# Patient Record
Sex: FEMALE | Race: WHITE | NOT HISPANIC OR LATINO | Employment: OTHER | URBAN - METROPOLITAN AREA
[De-identification: names, ages, dates, MRNs, and addresses within clinical notes are randomized per-mention and may not be internally consistent; named-entity substitution may affect disease eponyms.]

---

## 2023-06-30 DIAGNOSIS — E03.8 OTHER SPECIFIED HYPOTHYROIDISM: ICD-10-CM

## 2023-06-30 DIAGNOSIS — K21.00 GASTROESOPHAGEAL REFLUX DISEASE WITH ESOPHAGITIS WITHOUT HEMORRHAGE: ICD-10-CM

## 2023-07-01 RX ORDER — LEVOTHYROXINE SODIUM 50 UG/1
TABLET ORAL
Qty: 90 TABLET | Refills: 3 | Status: SHIPPED | OUTPATIENT
Start: 2023-07-01 | End: 2024-01-10 | Stop reason: SDUPTHER

## 2023-07-01 RX ORDER — OMEPRAZOLE 40 MG/1
CAPSULE, DELAYED RELEASE ORAL
Qty: 90 CAPSULE | Refills: 3 | Status: SHIPPED | OUTPATIENT
Start: 2023-07-01 | End: 2024-04-01

## 2023-09-12 PROBLEM — I10 HIGH BLOOD PRESSURE: Status: ACTIVE | Noted: 2023-09-12

## 2023-09-12 PROBLEM — E03.9 HYPOTHYROIDISM: Status: ACTIVE | Noted: 2023-09-12

## 2023-09-12 PROBLEM — K21.9 GASTROESOPHAGEAL REFLUX DISEASE: Status: ACTIVE | Noted: 2023-09-12

## 2023-09-12 PROBLEM — I49.3 VENTRICULAR PREMATURE COMPLEX: Status: ACTIVE | Noted: 2023-09-12

## 2023-09-12 PROBLEM — R92.8 ABNORMAL FINDING ON BREAST IMAGING: Status: ACTIVE | Noted: 2023-09-12

## 2023-09-12 PROBLEM — R00.2 HEART PALPITATIONS: Status: ACTIVE | Noted: 2023-09-12

## 2023-09-12 PROBLEM — M81.0 OSTEOPOROSIS: Status: ACTIVE | Noted: 2023-09-12

## 2023-09-12 PROBLEM — R09.89 PULSE IRREGULARITY: Status: ACTIVE | Noted: 2023-09-12

## 2023-09-12 PROBLEM — Q39.8 SHORT ESOPHAGUS: Status: ACTIVE | Noted: 2023-09-12

## 2023-09-12 PROBLEM — D64.9 ANEMIA: Status: ACTIVE | Noted: 2023-09-12

## 2023-09-12 PROBLEM — K44.9 HIATAL HERNIA: Status: ACTIVE | Noted: 2023-09-12

## 2023-09-12 PROBLEM — I51.7 CARDIOMEGALY: Status: ACTIVE | Noted: 2023-09-12

## 2023-09-12 PROBLEM — I82.90 VENOUS THROMBOSIS: Status: ACTIVE | Noted: 2023-09-12

## 2023-09-12 PROBLEM — M19.90 ARTHRITIS: Status: ACTIVE | Noted: 2023-09-12

## 2023-09-12 PROBLEM — E78.00 HYPERCHOLESTEROLEMIA: Status: ACTIVE | Noted: 2023-09-12

## 2023-09-12 PROBLEM — E56.9 VITAMIN DEFICIENCY: Status: ACTIVE | Noted: 2023-09-12

## 2023-09-12 RX ORDER — CALCIUM CARBONATE 600 MG
1 TABLET ORAL DAILY
COMMUNITY
Start: 2021-08-16

## 2023-09-12 RX ORDER — ACETAMINOPHEN 500 MG
1 TABLET ORAL DAILY
COMMUNITY
Start: 2021-08-16

## 2023-09-12 RX ORDER — CALCIUM CARBONATE/MULTIVITAMIN
1 TABLET,CHEWABLE ORAL DAILY
COMMUNITY
Start: 2019-11-08

## 2023-09-12 RX ORDER — METOPROLOL TARTRATE 100 MG/1
1 TABLET ORAL 2 TIMES DAILY
COMMUNITY

## 2023-09-12 RX ORDER — ROSUVASTATIN CALCIUM 10 MG/1
1 TABLET, COATED ORAL DAILY
COMMUNITY
Start: 2019-10-23 | End: 2024-04-12 | Stop reason: ALTCHOICE

## 2023-09-12 RX ORDER — AMLODIPINE BESYLATE 10 MG/1
1 TABLET ORAL DAILY
COMMUNITY
Start: 2019-10-23 | End: 2023-12-26

## 2023-09-12 RX ORDER — ENOXAPARIN SODIUM 100 MG/ML
40 INJECTION SUBCUTANEOUS DAILY
COMMUNITY
End: 2024-01-10 | Stop reason: WASHOUT

## 2023-09-12 RX ORDER — LOSARTAN POTASSIUM 50 MG/1
1 TABLET ORAL DAILY
COMMUNITY
End: 2024-04-12 | Stop reason: ALTCHOICE

## 2023-09-12 RX ORDER — ROSUVASTATIN CALCIUM 20 MG/1
1 TABLET, COATED ORAL DAILY
COMMUNITY
Start: 2019-10-23 | End: 2023-11-09

## 2023-09-12 RX ORDER — METOPROLOL SUCCINATE 100 MG/1
1 TABLET, EXTENDED RELEASE ORAL DAILY
COMMUNITY
End: 2024-05-07

## 2023-09-12 RX ORDER — OLMESARTAN MEDOXOMIL AND HYDROCHLOROTHIAZIDE 40/25 40; 25 MG/1; MG/1
1 TABLET ORAL DAILY
COMMUNITY
Start: 2021-12-10 | End: 2023-12-26

## 2023-09-12 RX ORDER — OLMESARTAN MEDOXOMIL 40 MG/1
1 TABLET ORAL DAILY
COMMUNITY
End: 2024-04-12 | Stop reason: ALTCHOICE

## 2023-09-12 RX ORDER — OXYCODONE HCL 5 MG/5 ML
5 SOLUTION, ORAL ORAL EVERY 4 HOURS PRN
COMMUNITY
End: 2024-01-10 | Stop reason: WASHOUT

## 2023-09-12 RX ORDER — LORATADINE 10 MG/1
1 TABLET ORAL DAILY
COMMUNITY

## 2023-12-24 DIAGNOSIS — I10 HYPERTENSION, UNSPECIFIED TYPE: Primary | ICD-10-CM

## 2023-12-26 RX ORDER — OLMESARTAN MEDOXOMIL AND HYDROCHLOROTHIAZIDE 40/25 40; 25 MG/1; MG/1
1 TABLET ORAL DAILY
Qty: 90 TABLET | Refills: 3 | Status: SHIPPED | OUTPATIENT
Start: 2023-12-26

## 2023-12-26 RX ORDER — AMLODIPINE BESYLATE 10 MG/1
10 TABLET ORAL DAILY
Qty: 90 TABLET | Refills: 3 | Status: SHIPPED | OUTPATIENT
Start: 2023-12-26

## 2024-01-08 DIAGNOSIS — E78.00 HYPERCHOLESTEROLEMIA: ICD-10-CM

## 2024-01-08 DIAGNOSIS — I10 PRIMARY HYPERTENSION: ICD-10-CM

## 2024-01-08 DIAGNOSIS — E03.9 HYPOTHYROIDISM, UNSPECIFIED TYPE: ICD-10-CM

## 2024-01-09 ENCOUNTER — LAB (OUTPATIENT)
Dept: LAB | Facility: LAB | Age: 79
End: 2024-01-09
Payer: COMMERCIAL

## 2024-01-09 DIAGNOSIS — E78.00 HYPERCHOLESTEROLEMIA: ICD-10-CM

## 2024-01-09 DIAGNOSIS — E03.9 HYPOTHYROIDISM, UNSPECIFIED TYPE: ICD-10-CM

## 2024-01-09 DIAGNOSIS — I10 PRIMARY HYPERTENSION: ICD-10-CM

## 2024-01-09 LAB
ALBUMIN SERPL BCP-MCNC: 4 G/DL (ref 3.4–5)
ALP SERPL-CCNC: 48 U/L (ref 33–136)
ALT SERPL W P-5'-P-CCNC: 14 U/L (ref 7–45)
ANION GAP SERPL CALC-SCNC: 14 MMOL/L (ref 10–20)
APPEARANCE UR: ABNORMAL
AST SERPL W P-5'-P-CCNC: 18 U/L (ref 9–39)
BACTERIA #/AREA URNS AUTO: ABNORMAL /HPF
BILIRUB SERPL-MCNC: 0.7 MG/DL (ref 0–1.2)
BILIRUB UR STRIP.AUTO-MCNC: NEGATIVE MG/DL
BUN SERPL-MCNC: 24 MG/DL (ref 6–23)
CALCIUM SERPL-MCNC: 9.4 MG/DL (ref 8.6–10.6)
CHLORIDE SERPL-SCNC: 103 MMOL/L (ref 98–107)
CHOLEST SERPL-MCNC: 154 MG/DL (ref 0–199)
CHOLESTEROL/HDL RATIO: 2.6
CO2 SERPL-SCNC: 28 MMOL/L (ref 21–32)
COLOR UR: YELLOW
CREAT SERPL-MCNC: 1.14 MG/DL (ref 0.5–1.05)
EGFRCR SERPLBLD CKD-EPI 2021: 49 ML/MIN/1.73M*2
ERYTHROCYTE [DISTWIDTH] IN BLOOD BY AUTOMATED COUNT: 12.1 % (ref 11.5–14.5)
GLUCOSE SERPL-MCNC: 103 MG/DL (ref 74–99)
GLUCOSE UR STRIP.AUTO-MCNC: NEGATIVE MG/DL
HCT VFR BLD AUTO: 35.3 % (ref 36–46)
HDLC SERPL-MCNC: 59 MG/DL
HGB BLD-MCNC: 11.5 G/DL (ref 12–16)
KETONES UR STRIP.AUTO-MCNC: NEGATIVE MG/DL
LDLC SERPL CALC-MCNC: 75 MG/DL
LEUKOCYTE ESTERASE UR QL STRIP.AUTO: ABNORMAL
MCH RBC QN AUTO: 33 PG (ref 26–34)
MCHC RBC AUTO-ENTMCNC: 32.6 G/DL (ref 32–36)
MCV RBC AUTO: 101 FL (ref 80–100)
NITRITE UR QL STRIP.AUTO: NEGATIVE
NON HDL CHOLESTEROL: 95 MG/DL (ref 0–149)
NRBC BLD-RTO: 0 /100 WBCS (ref 0–0)
PH UR STRIP.AUTO: 6 [PH]
PLATELET # BLD AUTO: 246 X10*3/UL (ref 150–450)
POTASSIUM SERPL-SCNC: 4 MMOL/L (ref 3.5–5.3)
PROT SERPL-MCNC: 7.4 G/DL (ref 6.4–8.2)
PROT UR STRIP.AUTO-MCNC: NEGATIVE MG/DL
RBC # BLD AUTO: 3.49 X10*6/UL (ref 4–5.2)
RBC # UR STRIP.AUTO: NEGATIVE /UL
RBC #/AREA URNS AUTO: ABNORMAL /HPF
SODIUM SERPL-SCNC: 141 MMOL/L (ref 136–145)
SP GR UR STRIP.AUTO: 1.01
TRIGL SERPL-MCNC: 102 MG/DL (ref 0–149)
TSH SERPL-ACNC: 2.22 MIU/L (ref 0.44–3.98)
UROBILINOGEN UR STRIP.AUTO-MCNC: <2 MG/DL
VLDL: 20 MG/DL (ref 0–40)
WBC # BLD AUTO: 6.6 X10*3/UL (ref 4.4–11.3)
WBC #/AREA URNS AUTO: >50 /HPF

## 2024-01-09 PROCEDURE — 85027 COMPLETE CBC AUTOMATED: CPT

## 2024-01-09 PROCEDURE — 36415 COLL VENOUS BLD VENIPUNCTURE: CPT

## 2024-01-09 PROCEDURE — 80061 LIPID PANEL: CPT

## 2024-01-09 PROCEDURE — 80053 COMPREHEN METABOLIC PANEL: CPT

## 2024-01-09 PROCEDURE — 84443 ASSAY THYROID STIM HORMONE: CPT

## 2024-01-09 PROCEDURE — 81001 URINALYSIS AUTO W/SCOPE: CPT

## 2024-01-10 ENCOUNTER — OFFICE VISIT (OUTPATIENT)
Dept: PRIMARY CARE | Facility: CLINIC | Age: 79
End: 2024-01-10
Payer: COMMERCIAL

## 2024-01-10 VITALS
HEIGHT: 60 IN | WEIGHT: 193 LBS | BODY MASS INDEX: 37.89 KG/M2 | SYSTOLIC BLOOD PRESSURE: 128 MMHG | DIASTOLIC BLOOD PRESSURE: 76 MMHG

## 2024-01-10 DIAGNOSIS — N28.9 RENAL INSUFFICIENCY: Primary | ICD-10-CM

## 2024-01-10 DIAGNOSIS — R53.83 OTHER FATIGUE: ICD-10-CM

## 2024-01-10 DIAGNOSIS — E78.00 HYPERCHOLESTEROLEMIA: ICD-10-CM

## 2024-01-10 DIAGNOSIS — R73.03 PRE-DIABETES: ICD-10-CM

## 2024-01-10 DIAGNOSIS — E03.8 OTHER SPECIFIED HYPOTHYROIDISM: ICD-10-CM

## 2024-01-10 DIAGNOSIS — I10 HYPERTENSION, UNSPECIFIED TYPE: ICD-10-CM

## 2024-01-10 PROCEDURE — 3074F SYST BP LT 130 MM HG: CPT | Performed by: INTERNAL MEDICINE

## 2024-01-10 PROCEDURE — 3078F DIAST BP <80 MM HG: CPT | Performed by: INTERNAL MEDICINE

## 2024-01-10 PROCEDURE — 1126F AMNT PAIN NOTED NONE PRSNT: CPT | Performed by: INTERNAL MEDICINE

## 2024-01-10 PROCEDURE — 1159F MED LIST DOCD IN RCRD: CPT | Performed by: INTERNAL MEDICINE

## 2024-01-10 PROCEDURE — 99214 OFFICE O/P EST MOD 30 MIN: CPT | Performed by: INTERNAL MEDICINE

## 2024-01-10 RX ORDER — LEVOTHYROXINE SODIUM 50 UG/1
50 TABLET ORAL DAILY
Qty: 90 TABLET | Refills: 1 | Status: SHIPPED | OUTPATIENT
Start: 2024-01-10 | End: 2024-03-16

## 2024-01-10 ASSESSMENT — ENCOUNTER SYMPTOMS
DEPRESSION: 0
OCCASIONAL FEELINGS OF UNSTEADINESS: 0
LOSS OF SENSATION IN FEET: 0

## 2024-01-11 DIAGNOSIS — E78.2 MIXED HYPERLIPIDEMIA: ICD-10-CM

## 2024-01-11 RX ORDER — ROSUVASTATIN CALCIUM 20 MG/1
20 TABLET, COATED ORAL DAILY
Qty: 90 TABLET | Refills: 3 | OUTPATIENT
Start: 2024-01-11

## 2024-01-11 NOTE — PROGRESS NOTES
"Subjective   Patient ID: Viji Caballero is a 78 y.o. female who presents for Follow-up (Various conditions) and Multiple medical issues.    Viji Caballero today came here for multiple medical issues.  Overall, she is a happy person.  Appetite and weight are okay.  No chest pain or shortness of breath.  Taking medications regularly.  No side effects.  She came for follow-up on various conditions.  She tells me she drinks adequate amount of water.  She is taking medications regularly.  No side effects.    I have personally reviewed the patient's Past Medical History, Medications, Allergies, Social History, and Family History in the EMR.    Review of Systems   All other systems reviewed and are negative.    Objective   /76   Ht 1.511 m (4' 11.5\")   Wt 87.5 kg (193 lb)   BMI 38.33 kg/m²     Physical Exam  Vitals reviewed.   Cardiovascular:      Heart sounds: Normal heart sounds, S1 normal and S2 normal. No murmur heard.     No friction rub.   Pulmonary:      Effort: Pulmonary effort is normal.      Breath sounds: Normal breath sounds and air entry.   Abdominal:      Palpations: There is no hepatomegaly, splenomegaly or mass.   Musculoskeletal:      Right lower leg: No edema.      Left lower leg: No edema.   Lymphadenopathy:      Lower Body: No right inguinal adenopathy. No left inguinal adenopathy.   Neurological:      Cranial Nerves: Cranial nerves 2-12 are intact.      Sensory: No sensory deficit.      Motor: Motor function is intact.      Deep Tendon Reflexes: Reflexes are normal and symmetric.     LAB WORK: Laboratory testing discussed.    Assessment/Plan   Problem List Items Addressed This Visit             ICD-10-CM       Cardiac and Vasculature    High blood pressure I10    Hypercholesterolemia E78.00       Endocrine/Metabolic    Hypothyroidism E03.9    Relevant Medications    levothyroxine (Synthroid, Levoxyl) 50 mcg tablet    Other Relevant Orders    Comprehensive Metabolic Panel    Thyroid " Stimulating Hormone    Lipid Panel     Other Visit Diagnoses         Codes    Renal insufficiency    -  Primary N28.9    Other fatigue     R53.83    Relevant Orders    Hemoglobin A1C    Pre-diabetes     R73.03        1. Renal insufficiency.  First time BUN and creatinine up.  Drink more water, cut down the salt.  She is only on ARB, I made a note of it.  She is going to bring the medication for review and drink enough water.  I am going to review, ______ in six to eight week’s time.  2. Hypertension, good.  3. Cholesterol, excellent.  She is seeing cardiologist.  4. Hypothyroid.  Same dose of Synthroid is okay.  5. Shots, okay.  6. Pre-diabetic.  I am going to check hemoglobin A1c.  7. Follow-up in March.  I urged her that appointment we will do with Medicare Wellness Visit.  She is willing to oblige that.    Scribe Attestation  By signing my name below, I, Lina Vital, Scribe attest that this documentation has been prepared under the direction and in the presence of Sean Bunch MD.

## 2024-01-12 ENCOUNTER — OFFICE VISIT (OUTPATIENT)
Dept: CARDIOLOGY | Facility: CLINIC | Age: 79
End: 2024-01-12
Payer: COMMERCIAL

## 2024-01-12 VITALS
HEART RATE: 78 BPM | BODY MASS INDEX: 35.37 KG/M2 | OXYGEN SATURATION: 95 % | SYSTOLIC BLOOD PRESSURE: 106 MMHG | WEIGHT: 192.2 LBS | DIASTOLIC BLOOD PRESSURE: 67 MMHG | HEIGHT: 62 IN

## 2024-01-12 DIAGNOSIS — I10 PRIMARY HYPERTENSION: ICD-10-CM

## 2024-01-12 DIAGNOSIS — R00.2 HEART PALPITATIONS: ICD-10-CM

## 2024-01-12 DIAGNOSIS — E78.00 HYPERCHOLESTEROLEMIA: ICD-10-CM

## 2024-01-12 DIAGNOSIS — I51.7 CARDIOMEGALY: ICD-10-CM

## 2024-01-12 PROCEDURE — 3078F DIAST BP <80 MM HG: CPT | Performed by: INTERNAL MEDICINE

## 2024-01-12 PROCEDURE — 99214 OFFICE O/P EST MOD 30 MIN: CPT | Performed by: INTERNAL MEDICINE

## 2024-01-12 PROCEDURE — 1126F AMNT PAIN NOTED NONE PRSNT: CPT | Performed by: INTERNAL MEDICINE

## 2024-01-12 PROCEDURE — 1159F MED LIST DOCD IN RCRD: CPT | Performed by: INTERNAL MEDICINE

## 2024-01-12 PROCEDURE — 1036F TOBACCO NON-USER: CPT | Performed by: INTERNAL MEDICINE

## 2024-01-12 PROCEDURE — 3074F SYST BP LT 130 MM HG: CPT | Performed by: INTERNAL MEDICINE

## 2024-01-12 ASSESSMENT — COLUMBIA-SUICIDE SEVERITY RATING SCALE - C-SSRS
1. IN THE PAST MONTH, HAVE YOU WISHED YOU WERE DEAD OR WISHED YOU COULD GO TO SLEEP AND NOT WAKE UP?: NO
6. HAVE YOU EVER DONE ANYTHING, STARTED TO DO ANYTHING, OR PREPARED TO DO ANYTHING TO END YOUR LIFE?: NO
2. HAVE YOU ACTUALLY HAD ANY THOUGHTS OF KILLING YOURSELF?: NO

## 2024-01-12 ASSESSMENT — PATIENT HEALTH QUESTIONNAIRE - PHQ9
2. FEELING DOWN, DEPRESSED OR HOPELESS: NOT AT ALL
SUM OF ALL RESPONSES TO PHQ9 QUESTIONS 1 AND 2: 0
1. LITTLE INTEREST OR PLEASURE IN DOING THINGS: NOT AT ALL

## 2024-01-12 ASSESSMENT — PAIN SCALES - GENERAL: PAINLEVEL: 0-NO PAIN

## 2024-01-12 ASSESSMENT — LIFESTYLE VARIABLES: HOW OFTEN DO YOU HAVE A DRINK CONTAINING ALCOHOL: 2-3 TIMES A WEEK

## 2024-02-11 NOTE — PROGRESS NOTES
Subjective   Viji Caballero is a 78 y.o. female.    Chief Complaint:  Follow-up, Hyperlipidemia, and Hypertension    HPI    ROS    Objective   Physical Exam    Lab Review:   Lab on 01/09/2024   Component Date Value    Cholesterol 01/09/2024 154     HDL-Cholesterol 01/09/2024 59.0     Cholesterol/HDL Ratio 01/09/2024 2.6     LDL Calculated 01/09/2024 75     VLDL 01/09/2024 20     Triglycerides 01/09/2024 102     Non HDL Cholesterol 01/09/2024 95     Glucose 01/09/2024 103 (H)     Sodium 01/09/2024 141     Potassium 01/09/2024 4.0     Chloride 01/09/2024 103     Bicarbonate 01/09/2024 28     Anion Gap 01/09/2024 14     Urea Nitrogen 01/09/2024 24 (H)     Creatinine 01/09/2024 1.14 (H)     eGFR 01/09/2024 49 (L)     Calcium 01/09/2024 9.4     Albumin 01/09/2024 4.0     Alkaline Phosphatase 01/09/2024 48     Total Protein 01/09/2024 7.4     AST 01/09/2024 18     Bilirubin, Total 01/09/2024 0.7     ALT 01/09/2024 14     WBC 01/09/2024 6.6     nRBC 01/09/2024 0.0     RBC 01/09/2024 3.49 (L)     Hemoglobin 01/09/2024 11.5 (L)     Hematocrit 01/09/2024 35.3 (L)     MCV 01/09/2024 101 (H)     MCH 01/09/2024 33.0     MCHC 01/09/2024 32.6     RDW 01/09/2024 12.1     Platelets 01/09/2024 246     Thyroid Stimulating Horm* 01/09/2024 2.22     Color, Urine 01/09/2024 Yellow     Appearance, Urine 01/09/2024 Hazy (N)     Specific Gravity, Urine 01/09/2024 1.015     pH, Urine 01/09/2024 6.0     Protein, Urine 01/09/2024 NEGATIVE     Glucose, Urine 01/09/2024 NEGATIVE     Blood, Urine 01/09/2024 NEGATIVE     Ketones, Urine 01/09/2024 NEGATIVE     Bilirubin, Urine 01/09/2024 NEGATIVE     Urobilinogen, Urine 01/09/2024 <2.0     Nitrite, Urine 01/09/2024 NEGATIVE     Leukocyte Esterase, Urine 01/09/2024 MODERATE (2+) (A)     WBC, Urine 01/09/2024 >50 (A)     RBC, Urine 01/09/2024 1-2     Bacteria, Urine 01/09/2024 3+ (A)        Assessment/Plan   Diagnoses of Cardiomegaly, Heart palpitations, Primary hypertension, and  Hypercholesterolemia were pertinent to this visit.

## 2024-03-08 ENCOUNTER — LAB (OUTPATIENT)
Dept: LAB | Facility: LAB | Age: 79
End: 2024-03-08
Payer: COMMERCIAL

## 2024-03-08 DIAGNOSIS — E03.8 OTHER SPECIFIED HYPOTHYROIDISM: ICD-10-CM

## 2024-03-08 DIAGNOSIS — R53.83 OTHER FATIGUE: ICD-10-CM

## 2024-03-08 LAB
ALBUMIN SERPL BCP-MCNC: 4 G/DL (ref 3.4–5)
ALP SERPL-CCNC: 50 U/L (ref 33–136)
ALT SERPL W P-5'-P-CCNC: 15 U/L (ref 7–45)
ANION GAP SERPL CALC-SCNC: 10 MMOL/L (ref 10–20)
AST SERPL W P-5'-P-CCNC: 18 U/L (ref 9–39)
BILIRUB SERPL-MCNC: 0.7 MG/DL (ref 0–1.2)
BUN SERPL-MCNC: 19 MG/DL (ref 6–23)
CALCIUM SERPL-MCNC: 9.5 MG/DL (ref 8.6–10.6)
CHLORIDE SERPL-SCNC: 101 MMOL/L (ref 98–107)
CHOLEST SERPL-MCNC: 149 MG/DL (ref 0–199)
CHOLESTEROL/HDL RATIO: 2.4
CO2 SERPL-SCNC: 28 MMOL/L (ref 21–32)
CREAT SERPL-MCNC: 0.96 MG/DL (ref 0.5–1.05)
EGFRCR SERPLBLD CKD-EPI 2021: 60 ML/MIN/1.73M*2
EST. AVERAGE GLUCOSE BLD GHB EST-MCNC: 123 MG/DL
GLUCOSE SERPL-MCNC: 101 MG/DL (ref 74–99)
HBA1C MFR BLD: 5.9 %
HDLC SERPL-MCNC: 62.2 MG/DL
LDLC SERPL CALC-MCNC: 69 MG/DL
NON HDL CHOLESTEROL: 87 MG/DL (ref 0–149)
POTASSIUM SERPL-SCNC: 4.3 MMOL/L (ref 3.5–5.3)
PROT SERPL-MCNC: 7 G/DL (ref 6.4–8.2)
SODIUM SERPL-SCNC: 135 MMOL/L (ref 136–145)
TRIGL SERPL-MCNC: 87 MG/DL (ref 0–149)
TSH SERPL-ACNC: 2.31 MIU/L (ref 0.44–3.98)
VLDL: 17 MG/DL (ref 0–40)

## 2024-03-08 PROCEDURE — 80061 LIPID PANEL: CPT

## 2024-03-08 PROCEDURE — 84443 ASSAY THYROID STIM HORMONE: CPT

## 2024-03-08 PROCEDURE — 83036 HEMOGLOBIN GLYCOSYLATED A1C: CPT

## 2024-03-08 PROCEDURE — 80053 COMPREHEN METABOLIC PANEL: CPT

## 2024-03-08 PROCEDURE — 36415 COLL VENOUS BLD VENIPUNCTURE: CPT

## 2024-03-11 ENCOUNTER — OFFICE VISIT (OUTPATIENT)
Dept: PRIMARY CARE | Facility: CLINIC | Age: 79
End: 2024-03-11
Payer: COMMERCIAL

## 2024-03-11 VITALS
DIASTOLIC BLOOD PRESSURE: 66 MMHG | WEIGHT: 188 LBS | HEIGHT: 59 IN | SYSTOLIC BLOOD PRESSURE: 110 MMHG | BODY MASS INDEX: 37.9 KG/M2

## 2024-03-11 DIAGNOSIS — E78.00 HYPERCHOLESTEROLEMIA: ICD-10-CM

## 2024-03-11 DIAGNOSIS — E03.9 HYPOTHYROIDISM, UNSPECIFIED TYPE: ICD-10-CM

## 2024-03-11 DIAGNOSIS — E13.9 OTHER SPECIFIED DIABETES MELLITUS WITHOUT COMPLICATION, WITHOUT LONG-TERM CURRENT USE OF INSULIN (MULTI): ICD-10-CM

## 2024-03-11 DIAGNOSIS — K21.9 GASTROESOPHAGEAL REFLUX DISEASE, UNSPECIFIED WHETHER ESOPHAGITIS PRESENT: ICD-10-CM

## 2024-03-11 DIAGNOSIS — Z00.00 MEDICARE WELCOME EXAM: Primary | ICD-10-CM

## 2024-03-11 DIAGNOSIS — I10 HYPERTENSION, UNSPECIFIED TYPE: ICD-10-CM

## 2024-03-11 DIAGNOSIS — Z12.11 ENCOUNTER FOR SCREENING COLONOSCOPY: ICD-10-CM

## 2024-03-11 PROCEDURE — 1159F MED LIST DOCD IN RCRD: CPT | Performed by: INTERNAL MEDICINE

## 2024-03-11 PROCEDURE — 1126F AMNT PAIN NOTED NONE PRSNT: CPT | Performed by: INTERNAL MEDICINE

## 2024-03-11 PROCEDURE — 1160F RVW MEDS BY RX/DR IN RCRD: CPT | Performed by: INTERNAL MEDICINE

## 2024-03-11 PROCEDURE — 1036F TOBACCO NON-USER: CPT | Performed by: INTERNAL MEDICINE

## 2024-03-11 PROCEDURE — G0439 PPPS, SUBSEQ VISIT: HCPCS | Performed by: INTERNAL MEDICINE

## 2024-03-11 PROCEDURE — 3078F DIAST BP <80 MM HG: CPT | Performed by: INTERNAL MEDICINE

## 2024-03-11 PROCEDURE — 99214 OFFICE O/P EST MOD 30 MIN: CPT | Performed by: INTERNAL MEDICINE

## 2024-03-11 PROCEDURE — 1170F FXNL STATUS ASSESSED: CPT | Performed by: INTERNAL MEDICINE

## 2024-03-11 PROCEDURE — 3074F SYST BP LT 130 MM HG: CPT | Performed by: INTERNAL MEDICINE

## 2024-03-11 PROCEDURE — 1157F ADVNC CARE PLAN IN RCRD: CPT | Performed by: INTERNAL MEDICINE

## 2024-03-11 RX ORDER — METFORMIN HYDROCHLORIDE 500 MG/1
500 TABLET ORAL
Qty: 180 TABLET | Refills: 1 | Status: SHIPPED | OUTPATIENT
Start: 2024-03-11 | End: 2024-05-20

## 2024-03-11 RX ORDER — BLOOD SUGAR DIAGNOSTIC
STRIP MISCELLANEOUS
Qty: 200 EACH | Refills: 3 | Status: SHIPPED | OUTPATIENT
Start: 2024-03-11 | End: 2024-04-12 | Stop reason: WASHOUT

## 2024-03-11 RX ORDER — INSULIN PUMP SYRINGE, 3 ML
EACH MISCELLANEOUS
Qty: 1 EACH | Refills: 0 | Status: SHIPPED | OUTPATIENT
Start: 2024-03-11 | End: 2024-04-12 | Stop reason: WASHOUT

## 2024-03-11 RX ORDER — LANCETS
EACH MISCELLANEOUS
Qty: 200 EACH | Refills: 3 | Status: SHIPPED | OUTPATIENT
Start: 2024-03-11 | End: 2024-04-12 | Stop reason: WASHOUT

## 2024-03-11 ASSESSMENT — ENCOUNTER SYMPTOMS
DEPRESSION: 0
OCCASIONAL FEELINGS OF UNSTEADINESS: 0
LOSS OF SENSATION IN FEET: 0

## 2024-03-11 ASSESSMENT — ACTIVITIES OF DAILY LIVING (ADL)
BATHING: INDEPENDENT
MANAGING_FINANCES: INDEPENDENT
DOING_HOUSEWORK: INDEPENDENT
TAKING_MEDICATION: INDEPENDENT
DRESSING: INDEPENDENT
GROCERY_SHOPPING: INDEPENDENT

## 2024-03-12 NOTE — PROGRESS NOTES
"Subjective   Patient ID: Viji Caballero is a 78 y.o. female who presents for Medicare Annual Wellness Visit Subsequent.    Ms. Hallman today came here for multiple issues.  1. Medicare Wellness Visit.  I thanked her for that.  2. Follow up on blood work and other conditions.  Polypharmacy review.    IMMUNIZATION:  Up to date.  We are not sure about pneumonia 23, she will let me know.  Other shots are up to date.    HEALTH MAINTENANCE:  She has never had a colonoscopy.  She might think about Cologuard, she is not sure.    I have personally reviewed the patient's Past Medical History, Medications, Allergies, Social History, and Family History in the EMR.    Review of Systems   All other systems reviewed and are negative.  The patient has never had a stroke.  No heart attack.  No cancer.    Objective   /66   Ht 1.499 m (4' 11\")   Wt 85.3 kg (188 lb)   BMI 37.97 kg/m²     Physical Exam  Vitals reviewed.   HENT:      Right Ear: Tympanic membrane, ear canal and external ear normal.      Left Ear: Tympanic membrane, ear canal and external ear normal.   Eyes:      General: No scleral icterus.     Pupils: Pupils are equal, round, and reactive to light.   Neck:      Vascular: No carotid bruit.   Cardiovascular:      Heart sounds: Normal heart sounds, S1 normal and S2 normal. No murmur heard.     No friction rub.   Pulmonary:      Effort: Pulmonary effort is normal.      Breath sounds: Normal breath sounds and air entry.   Chest:      Comments: BREAST:  Deferred by the patient.  Abdominal:      Palpations: There is no hepatomegaly, splenomegaly or mass.   Genitourinary:     Comments: VAGINAL:  Deferred by the patient.  RECTAL:  Deferred by the patient.  Musculoskeletal:         General: No swelling or deformity. Normal range of motion.      Cervical back: Neck supple.      Right lower leg: No edema.      Left lower leg: No edema.   Lymphadenopathy:      Cervical: No cervical adenopathy.      Upper Body:      " Right upper body: No axillary adenopathy.      Left upper body: No axillary adenopathy.      Lower Body: No right inguinal adenopathy. No left inguinal adenopathy.   Neurological:      Mental Status: She is oriented to person, place, and time.      Cranial Nerves: Cranial nerves 2-12 are intact. No cranial nerve deficit.      Sensory: No sensory deficit.      Motor: Motor function is intact. No weakness.      Gait: Gait is intact.      Deep Tendon Reflexes: Reflexes normal.   Psychiatric:         Mood and Affect: Mood normal. Mood is not anxious or depressed. Affect is not angry.         Behavior: Behavior is not agitated.         Thought Content: Thought content normal.         Judgment: Judgment normal.     LAB WORK:  Laboratory testing discussed.    Assessment/Plan   Problem List Items Addressed This Visit             ICD-10-CM       Cardiac and Vasculature    High blood pressure I10    Hypercholesterolemia E78.00       Endocrine/Metabolic    Hypothyroidism E03.9       Gastrointestinal and Abdominal    Gastroesophageal reflux disease K21.9     Other Visit Diagnoses         Codes    Medicare welcome exam    -  Primary Z00.00    Other specified diabetes mellitus without complication, without long-term current use of insulin (CMS/[facility])     E13.9    Relevant Medications    metFORMIN (Glucophage) 500 mg tablet    lancets misc    Blood glucose monitoring meter kit kit    FreeStyle Test strip    Encounter for screening colonoscopy     Z12.11    Relevant Orders    Cologuard® colon cancer screening        1. Medicare Wellness Visit, done.  2. The patient is not depressed, not suicidal.  The patient is full code.   is a legal power of  in case of emergency, paperwork there.  3. Type 2 diabetes, new onset.  We gave her machine, strips, Lancet, diabetic education.  She wants to lose weight.  We dis discuss about Rybelsus, Ozempic, but she wants to try metformin ______(sample?) 500 mg twice a day.  Diarrhea  explained.  The patient does not consume any alcohol.  4. Hypertension, excellent.  5. Cholesterol, excellent.  6. Thyroid, okay.  7. GERD, on PPI.  8. Immunization, okay.  9. All 12 systems reviewed.  10. I shall see her back in a month to review the situation.  11. Welcome back.    Scribe Attestation  By signing my name below, I, Antonio Galeas attest that this documentation has been prepared under the direction and in the presence of Sean Bunch MD.

## 2024-03-31 DIAGNOSIS — K21.00 GASTROESOPHAGEAL REFLUX DISEASE WITH ESOPHAGITIS WITHOUT HEMORRHAGE: ICD-10-CM

## 2024-04-01 RX ORDER — OMEPRAZOLE 40 MG/1
CAPSULE, DELAYED RELEASE ORAL
Qty: 100 CAPSULE | Refills: 0 | Status: SHIPPED | OUTPATIENT
Start: 2024-04-01 | End: 2024-04-12 | Stop reason: WASHOUT

## 2024-04-12 ENCOUNTER — OFFICE VISIT (OUTPATIENT)
Dept: PRIMARY CARE | Facility: CLINIC | Age: 79
End: 2024-04-12
Payer: COMMERCIAL

## 2024-04-12 VITALS
BODY MASS INDEX: 36.61 KG/M2 | SYSTOLIC BLOOD PRESSURE: 124 MMHG | DIASTOLIC BLOOD PRESSURE: 88 MMHG | HEIGHT: 59 IN | WEIGHT: 181.6 LBS

## 2024-04-12 DIAGNOSIS — Z78.0 POSTMENOPAUSAL: Primary | ICD-10-CM

## 2024-04-12 DIAGNOSIS — E03.9 HYPOTHYROIDISM, UNSPECIFIED TYPE: ICD-10-CM

## 2024-04-12 DIAGNOSIS — E78.00 HYPERCHOLESTEROLEMIA: ICD-10-CM

## 2024-04-12 DIAGNOSIS — I10 HYPERTENSION, UNSPECIFIED TYPE: ICD-10-CM

## 2024-04-12 DIAGNOSIS — E13.9 OTHER SPECIFIED DIABETES MELLITUS WITHOUT COMPLICATION, WITHOUT LONG-TERM CURRENT USE OF INSULIN (MULTI): ICD-10-CM

## 2024-04-12 PROCEDURE — 99213 OFFICE O/P EST LOW 20 MIN: CPT | Performed by: INTERNAL MEDICINE

## 2024-04-12 PROCEDURE — 1159F MED LIST DOCD IN RCRD: CPT | Performed by: INTERNAL MEDICINE

## 2024-04-12 PROCEDURE — 3079F DIAST BP 80-89 MM HG: CPT | Performed by: INTERNAL MEDICINE

## 2024-04-12 PROCEDURE — 1160F RVW MEDS BY RX/DR IN RCRD: CPT | Performed by: INTERNAL MEDICINE

## 2024-04-12 PROCEDURE — 3074F SYST BP LT 130 MM HG: CPT | Performed by: INTERNAL MEDICINE

## 2024-04-12 PROCEDURE — 1157F ADVNC CARE PLAN IN RCRD: CPT | Performed by: INTERNAL MEDICINE

## 2024-04-12 NOTE — PROGRESS NOTES
"Subjective   Patient ID: Viji Caballero is a 78 y.o. female who presents for Follow-up (multiple medical issues).    Viji Caballero today came here for multiple medical issues.  Overall, she is a happy person.  Appetite and weight are okay.  No chest pain.  Taking medications regularly.  She brought a very good record of [facility].  She told me she had her both pneumonia shots.  She had a shingles shot at [facility] and she is up to date.    I have personally reviewed the patient's Past Medical History, Medications, Allergies, Social History, and Family History in the EMR.    Review of Systems   All other systems reviewed and are negative.    Objective   /88   Ht 1.499 m (4' 11\")   Wt 82.4 kg (181 lb 9.6 oz)   BMI 36.68 kg/m²     Physical Exam  Vitals reviewed.   Cardiovascular:      Heart sounds: Normal heart sounds, S1 normal and S2 normal. No murmur heard.     No friction rub.   Pulmonary:      Effort: Pulmonary effort is normal.      Breath sounds: Normal breath sounds and air entry.   Abdominal:      Palpations: There is no hepatomegaly, splenomegaly or mass.   Musculoskeletal:      Right lower leg: No edema.      Left lower leg: No edema.   Lymphadenopathy:      Lower Body: No right inguinal adenopathy. No left inguinal adenopathy.   Neurological:      Cranial Nerves: Cranial nerves 2-12 are intact.      Sensory: No sensory deficit.      Motor: Motor function is intact.      Deep Tendon Reflexes: Reflexes are normal and symmetric.     LAB WORK:  Laboratory testing discussed.    Assessment/Plan   Problem List Items Addressed This Visit             ICD-10-CM       Cardiac and Vasculature    High blood pressure I10    Relevant Orders    CBC    Urinalysis with Reflex Microscopic    Hypercholesterolemia E78.00    Relevant Orders    Comprehensive Metabolic Panel    Lipid Panel       Endocrine/Metabolic    Hypothyroidism E03.9    Relevant Orders    Thyroid Stimulating Hormone     Other Visit Diagnoses "         Codes    Postmenopausal    -  Primary Z78.0    Other specified diabetes mellitus without complication, without long-term current use of insulin (Multi)     E13.9    Relevant Orders    Hemoglobin A1C        1. Type 2 diabetes.  ______.  Monitor.  2. Hypertension, okay.  3. High cholesterol, stable.  4. Immunization, up to date.  5. Postmenopausal.  Take adequate calcium.  6. Follow-up appointment with me in three months.  Happy to see her anytime sooner if necessary.    Scribe Attestation  By signing my name below, I, Antonio Galeas attest that this documentation has been prepared under the direction and in the presence of Sean Bunch MD.

## 2024-05-07 DIAGNOSIS — I10 HYPERTENSION, UNSPECIFIED TYPE: Primary | ICD-10-CM

## 2024-05-07 RX ORDER — METOPROLOL SUCCINATE 100 MG/1
100 TABLET, EXTENDED RELEASE ORAL DAILY
Qty: 100 TABLET | Refills: 2 | Status: SHIPPED | OUTPATIENT
Start: 2024-05-07

## 2024-06-10 ENCOUNTER — LAB (OUTPATIENT)
Dept: LAB | Facility: LAB | Age: 79
End: 2024-06-10
Payer: COMMERCIAL

## 2024-06-10 DIAGNOSIS — E03.9 HYPOTHYROIDISM, UNSPECIFIED TYPE: ICD-10-CM

## 2024-06-10 DIAGNOSIS — I10 HYPERTENSION, UNSPECIFIED TYPE: ICD-10-CM

## 2024-06-10 DIAGNOSIS — E13.9 OTHER SPECIFIED DIABETES MELLITUS WITHOUT COMPLICATION, WITHOUT LONG-TERM CURRENT USE OF INSULIN (MULTI): ICD-10-CM

## 2024-06-10 DIAGNOSIS — E78.00 HYPERCHOLESTEROLEMIA: ICD-10-CM

## 2024-06-10 LAB
ALBUMIN SERPL BCP-MCNC: 4 G/DL (ref 3.4–5)
ALP SERPL-CCNC: 42 U/L (ref 33–136)
ALT SERPL W P-5'-P-CCNC: 11 U/L (ref 7–45)
ANION GAP SERPL CALC-SCNC: 12 MMOL/L (ref 10–20)
APPEARANCE UR: ABNORMAL
AST SERPL W P-5'-P-CCNC: 19 U/L (ref 9–39)
BACTERIA #/AREA URNS AUTO: ABNORMAL /HPF
BILIRUB SERPL-MCNC: 0.7 MG/DL (ref 0–1.2)
BILIRUB UR STRIP.AUTO-MCNC: NEGATIVE MG/DL
BUN SERPL-MCNC: 22 MG/DL (ref 6–23)
CALCIUM SERPL-MCNC: 9.5 MG/DL (ref 8.6–10.6)
CHLORIDE SERPL-SCNC: 103 MMOL/L (ref 98–107)
CHOLEST SERPL-MCNC: 139 MG/DL (ref 0–199)
CHOLESTEROL/HDL RATIO: 2.5
CO2 SERPL-SCNC: 28 MMOL/L (ref 21–32)
COLOR UR: ABNORMAL
CREAT SERPL-MCNC: 1.45 MG/DL (ref 0.5–1.05)
EGFRCR SERPLBLD CKD-EPI 2021: 37 ML/MIN/1.73M*2
ERYTHROCYTE [DISTWIDTH] IN BLOOD BY AUTOMATED COUNT: 12 % (ref 11.5–14.5)
EST. AVERAGE GLUCOSE BLD GHB EST-MCNC: 111 MG/DL
GLUCOSE SERPL-MCNC: 102 MG/DL (ref 74–99)
GLUCOSE UR STRIP.AUTO-MCNC: NORMAL MG/DL
HBA1C MFR BLD: 5.5 %
HCT VFR BLD AUTO: 32.3 % (ref 36–46)
HDLC SERPL-MCNC: 55.9 MG/DL
HGB BLD-MCNC: 10.4 G/DL (ref 12–16)
HYALINE CASTS #/AREA URNS AUTO: ABNORMAL /LPF
KETONES UR STRIP.AUTO-MCNC: NEGATIVE MG/DL
LDLC SERPL CALC-MCNC: 61 MG/DL
LEUKOCYTE ESTERASE UR QL STRIP.AUTO: ABNORMAL
MCH RBC QN AUTO: 33.2 PG (ref 26–34)
MCHC RBC AUTO-ENTMCNC: 32.2 G/DL (ref 32–36)
MCV RBC AUTO: 103 FL (ref 80–100)
MUCOUS THREADS #/AREA URNS AUTO: ABNORMAL /LPF
NITRITE UR QL STRIP.AUTO: NEGATIVE
NON HDL CHOLESTEROL: 83 MG/DL (ref 0–149)
NRBC BLD-RTO: 0 /100 WBCS (ref 0–0)
PH UR STRIP.AUTO: 5.5 [PH]
PLATELET # BLD AUTO: 238 X10*3/UL (ref 150–450)
POTASSIUM SERPL-SCNC: 4.3 MMOL/L (ref 3.5–5.3)
PROT SERPL-MCNC: 7.1 G/DL (ref 6.4–8.2)
PROT UR STRIP.AUTO-MCNC: ABNORMAL MG/DL
RBC # BLD AUTO: 3.13 X10*6/UL (ref 4–5.2)
RBC # UR STRIP.AUTO: ABNORMAL /UL
RBC #/AREA URNS AUTO: ABNORMAL /HPF
SODIUM SERPL-SCNC: 139 MMOL/L (ref 136–145)
SP GR UR STRIP.AUTO: 1.01
TRIGL SERPL-MCNC: 111 MG/DL (ref 0–149)
TSH SERPL-ACNC: 1.98 MIU/L (ref 0.44–3.98)
UROBILINOGEN UR STRIP.AUTO-MCNC: NORMAL MG/DL
VLDL: 22 MG/DL (ref 0–40)
WBC # BLD AUTO: 6.1 X10*3/UL (ref 4.4–11.3)
WBC #/AREA URNS AUTO: >50 /HPF
WBC CLUMPS #/AREA URNS AUTO: ABNORMAL /HPF

## 2024-06-10 PROCEDURE — 81001 URINALYSIS AUTO W/SCOPE: CPT

## 2024-06-10 PROCEDURE — 85027 COMPLETE CBC AUTOMATED: CPT

## 2024-06-10 PROCEDURE — 83036 HEMOGLOBIN GLYCOSYLATED A1C: CPT

## 2024-06-10 PROCEDURE — 80053 COMPREHEN METABOLIC PANEL: CPT

## 2024-06-10 PROCEDURE — 80061 LIPID PANEL: CPT

## 2024-06-10 PROCEDURE — 84443 ASSAY THYROID STIM HORMONE: CPT

## 2024-06-10 PROCEDURE — 36415 COLL VENOUS BLD VENIPUNCTURE: CPT

## 2024-06-14 ENCOUNTER — OFFICE VISIT (OUTPATIENT)
Dept: PRIMARY CARE | Facility: CLINIC | Age: 79
End: 2024-06-14
Payer: COMMERCIAL

## 2024-06-14 VITALS
DIASTOLIC BLOOD PRESSURE: 72 MMHG | WEIGHT: 176 LBS | BODY MASS INDEX: 35.48 KG/M2 | SYSTOLIC BLOOD PRESSURE: 128 MMHG | HEIGHT: 59 IN

## 2024-06-14 DIAGNOSIS — D22.9 BENIGN MOLE: ICD-10-CM

## 2024-06-14 DIAGNOSIS — E13.9 OTHER SPECIFIED DIABETES MELLITUS WITHOUT COMPLICATION, WITHOUT LONG-TERM CURRENT USE OF INSULIN (MULTI): ICD-10-CM

## 2024-06-14 DIAGNOSIS — I10 HYPERTENSION, UNSPECIFIED TYPE: ICD-10-CM

## 2024-06-14 DIAGNOSIS — L81.2 FRECKLES: ICD-10-CM

## 2024-06-14 DIAGNOSIS — E78.00 HYPERCHOLESTEROLEMIA: ICD-10-CM

## 2024-06-14 DIAGNOSIS — E03.9 HYPOTHYROIDISM, UNSPECIFIED TYPE: ICD-10-CM

## 2024-06-14 DIAGNOSIS — D64.9 ANEMIA, UNSPECIFIED TYPE: Primary | ICD-10-CM

## 2024-06-14 PROCEDURE — 99213 OFFICE O/P EST LOW 20 MIN: CPT | Performed by: INTERNAL MEDICINE

## 2024-06-14 PROCEDURE — 3078F DIAST BP <80 MM HG: CPT | Performed by: INTERNAL MEDICINE

## 2024-06-14 PROCEDURE — 3074F SYST BP LT 130 MM HG: CPT | Performed by: INTERNAL MEDICINE

## 2024-06-14 PROCEDURE — 1159F MED LIST DOCD IN RCRD: CPT | Performed by: INTERNAL MEDICINE

## 2024-06-14 PROCEDURE — 1157F ADVNC CARE PLAN IN RCRD: CPT | Performed by: INTERNAL MEDICINE

## 2024-06-14 ASSESSMENT — ENCOUNTER SYMPTOMS
LOSS OF SENSATION IN FEET: 0
DEPRESSION: 0
OCCASIONAL FEELINGS OF UNSTEADINESS: 0

## 2024-06-14 NOTE — PROGRESS NOTES
"Subjective   Patient ID: Viji Caballero is a 78 y.o. female who presents for multiple medical issues..    Viji Caballero today came here for multiple medical issues.  Overall, she is a happy person.  Appetite and weight are okay.  No chest pain.  Taking medications regularly.  No side effects.  She has not done Pap test, mammogram.    I have personally reviewed the patient's Past Medical History, Medications, Allergies, Social History, and Family History in the EMR.    Review of Systems   All other systems reviewed and are negative.    Objective   /72   Ht 1.499 m (4' 11\")   Wt 79.8 kg (176 lb)   BMI 35.55 kg/m²     Physical Exam  Vitals reviewed.   Cardiovascular:      Heart sounds: Normal heart sounds, S1 normal and S2 normal. No murmur heard.     No friction rub.   Pulmonary:      Effort: Pulmonary effort is normal.      Breath sounds: Normal breath sounds and air entry.   Abdominal:      Palpations: There is no hepatomegaly, splenomegaly or mass.   Musculoskeletal:      Right lower leg: No edema.      Left lower leg: No edema.   Lymphadenopathy:      Lower Body: No right inguinal adenopathy. No left inguinal adenopathy.   Neurological:      Cranial Nerves: Cranial nerves 2-12 are intact.      Sensory: No sensory deficit.      Motor: Motor function is intact.      Deep Tendon Reflexes: Reflexes are normal and symmetric.     LAB WORK: Laboratory testing discussed.    Assessment/Plan   Problem List Items Addressed This Visit             ICD-10-CM       Cardiac and Vasculature    High blood pressure I10    Relevant Orders    CBC    Urinalysis with Reflex Microscopic    Hypercholesterolemia E78.00    Relevant Orders    Comprehensive Metabolic Panel    Lipid Panel       Endocrine/Metabolic    Hypothyroidism E03.9    Relevant Orders    Thyroid Stimulating Hormone       Hematology and Neoplasia    Anemia - Primary D64.9     Other Visit Diagnoses         Codes    Other specified diabetes mellitus without " complication, without long-term current use of insulin (Multi)     E13.9    Relevant Orders    Hemoglobin A1C    Benign mole     D22.9    Freckles     L81.2        1. Hypertension, okay.  2. Cholesterol.  Monitor.  3. Anemia, stable.  4. Gynecological.  Pap test, mammogram.  5. Type 2 diabetes.  Hemoglobin A1c is much better.  6. Weight, she is losing it.  Doing good.  7. Skin moles and freckles, okay.  8. Follow-up in three months, but always happy to serve her anytime sooner if necessary.    Scribe Attestation  By signing my name below, I, Antonio Galeas attest that this documentation has been prepared under the direction and in the presence of Sean Bunch MD.

## 2024-06-18 DIAGNOSIS — E03.8 OTHER SPECIFIED HYPOTHYROIDISM: ICD-10-CM

## 2024-06-19 RX ORDER — LEVOTHYROXINE SODIUM 50 UG/1
50 TABLET ORAL DAILY
Qty: 100 TABLET | Refills: 0 | Status: SHIPPED | OUTPATIENT
Start: 2024-06-19

## 2024-07-12 ENCOUNTER — OFFICE VISIT (OUTPATIENT)
Dept: CARDIOLOGY | Facility: CLINIC | Age: 79
End: 2024-07-12
Payer: MEDICARE

## 2024-07-12 VITALS
DIASTOLIC BLOOD PRESSURE: 68 MMHG | SYSTOLIC BLOOD PRESSURE: 120 MMHG | HEART RATE: 76 BPM | BODY MASS INDEX: 32.45 KG/M2 | OXYGEN SATURATION: 94 % | HEIGHT: 61 IN | WEIGHT: 171.9 LBS

## 2024-07-12 DIAGNOSIS — I10 HYPERTENSION, UNSPECIFIED TYPE: Primary | ICD-10-CM

## 2024-07-12 PROCEDURE — 99214 OFFICE O/P EST MOD 30 MIN: CPT | Performed by: INTERNAL MEDICINE

## 2024-07-12 PROCEDURE — 1157F ADVNC CARE PLAN IN RCRD: CPT | Performed by: INTERNAL MEDICINE

## 2024-07-12 PROCEDURE — 1159F MED LIST DOCD IN RCRD: CPT | Performed by: INTERNAL MEDICINE

## 2024-07-12 PROCEDURE — 3074F SYST BP LT 130 MM HG: CPT | Performed by: INTERNAL MEDICINE

## 2024-07-12 PROCEDURE — 3078F DIAST BP <80 MM HG: CPT | Performed by: INTERNAL MEDICINE

## 2024-07-12 PROCEDURE — 1126F AMNT PAIN NOTED NONE PRSNT: CPT | Performed by: INTERNAL MEDICINE

## 2024-07-12 RX ORDER — LOSARTAN POTASSIUM 100 MG/1
100 TABLET ORAL DAILY
Qty: 90 TABLET | Refills: 3 | Status: SHIPPED | OUTPATIENT
Start: 2024-07-12 | End: 2025-07-12

## 2024-07-12 ASSESSMENT — COLUMBIA-SUICIDE SEVERITY RATING SCALE - C-SSRS
2. HAVE YOU ACTUALLY HAD ANY THOUGHTS OF KILLING YOURSELF?: NO
6. HAVE YOU EVER DONE ANYTHING, STARTED TO DO ANYTHING, OR PREPARED TO DO ANYTHING TO END YOUR LIFE?: NO
1. IN THE PAST MONTH, HAVE YOU WISHED YOU WERE DEAD OR WISHED YOU COULD GO TO SLEEP AND NOT WAKE UP?: NO

## 2024-07-12 ASSESSMENT — PATIENT HEALTH QUESTIONNAIRE - PHQ9
SUM OF ALL RESPONSES TO PHQ9 QUESTIONS 1 AND 2: 0
2. FEELING DOWN, DEPRESSED OR HOPELESS: NOT AT ALL
1. LITTLE INTEREST OR PLEASURE IN DOING THINGS: NOT AT ALL

## 2024-07-12 ASSESSMENT — PAIN SCALES - GENERAL: PAINLEVEL: 0-NO PAIN

## 2024-07-12 NOTE — PROGRESS NOTES
Primary Care Physician: Sean Bunch MD  Date of Visit: 07/12/2024 11:30 AM EDT  Location of visit: [facility] [address]     Chief Complaint:   Chief Complaint   Patient presents with    Follow-up    Hyperlipidemia    Hypertension    cardiomegaly     HPI / Summary:   Viji Caballero is a 78 y.o. female presents for follow up     ROS    Medical History:   She has a past medical history of Arthritis, Diabetes mellitus (Multi), High cholesterol, Hypertension, Personal history of other specified conditions (12/05/2018), and Postmenopausal.  Surgical Hx:   She has no past surgical history on file.   Social Hx:   She reports that she has never smoked. She has never used smokeless tobacco. She reports that she does not drink alcohol. No history on file for drug use.  Family Hx:   Her family history includes Diabetes in her mother and another family member; Hypertension in an other family member; Lung cancer in her father; Stroke in her mother.   Allergies:  No Known Allergies  Outpatient Medications:  Current Outpatient Medications   Medication Instructions    amLODIPine (NORVASC) 10 mg, oral, Daily    calcium carbonate 600 mg calcium (1,500 mg) tablet 1 tablet, oral, Daily    cholecalciferol (Vitamin D-3) 5,000 Units tablet 1 tablet, oral, Daily    levothyroxine (SYNTHROID, LEVOXYL) 50 mcg, oral, Daily    loratadine (Claritin) 10 mg tablet 1 tablet, oral, Daily    metFORMIN (GLUCOPHAGE) 500 mg, oral, 2 times daily (morning and late afternoon)    metoprolol succinate XL (TOPROL-XL) 100 mg, oral, Daily    metoprolol tartrate (Lopressor) 100 mg tablet 1 tablet, oral, 2 times daily    multivitamin-calcium carb (Flintstones Plus Calcium) tablet,chewable 1 tablet, oral, Daily    olmesartan-hydrochlorothiazide (BENIcar HCT) 40-25 mg tablet 1 tablet, oral, Daily    pedi multivit 43-iron fumarate (Flintstones Complete, iron,) 18 mg iron tablet,chewable oral, As directed    rosuvastatin (CRESTOR) 20 mg, oral, Daily     Physical  "Exam:  Vitals:    07/12/24 1142   BP: 96/61   BP Location: Left arm   Patient Position: Sitting   BP Cuff Size: Large adult   Pulse: (!) 49   SpO2: 94%   Weight: 78 kg (171 lb 14.4 oz)   Height: 1.549 m (5' 1\")     Wt Readings from Last 5 Encounters:   07/12/24 78 kg (171 lb 14.4 oz)   06/14/24 79.8 kg (176 lb)   04/12/24 82.4 kg (181 lb 9.6 oz)   03/11/24 85.3 kg (188 lb)   01/12/24 87.2 kg (192 lb 3.2 oz)     Physical Exam  JVP not elevated. Carotid impulses are 2+ without overlying bruit.   Chest exhibits fair to good air movement with completely clear breath sounds.   The cardiac rhythm is regular with no premature beats.   Normal S1 and S2. No gallop, murmur or rub, or click.   Abdomen is soft and benign without focal tenderness.   With no lower leg edema. The pedal pulses are intact.     Last Labs:  Lab on 06/10/2024   Component Date Value    WBC 06/10/2024 6.1     nRBC 06/10/2024 0.0     RBC 06/10/2024 3.13 (L)     Hemoglobin 06/10/2024 10.4 (L)     Hematocrit 06/10/2024 32.3 (L)     MCV 06/10/2024 103 (H)     MCH 06/10/2024 33.2     MCHC 06/10/2024 32.2     RDW 06/10/2024 12.0     Platelets 06/10/2024 238     Glucose 06/10/2024 102 (H)     Sodium 06/10/2024 139     Potassium 06/10/2024 4.3     Chloride 06/10/2024 103     Bicarbonate 06/10/2024 28     Anion Gap 06/10/2024 12     Urea Nitrogen 06/10/2024 22     Creatinine 06/10/2024 1.45 (H)     eGFR 06/10/2024 37 (L)     Calcium 06/10/2024 9.5     Albumin 06/10/2024 4.0     Alkaline Phosphatase 06/10/2024 42     Total Protein 06/10/2024 7.1     AST 06/10/2024 19     Bilirubin, Total 06/10/2024 0.7     ALT 06/10/2024 11     Cholesterol 06/10/2024 139     HDL-Cholesterol 06/10/2024 55.9     Cholesterol/HDL Ratio 06/10/2024 2.5     LDL Calculated 06/10/2024 61     VLDL 06/10/2024 22     Triglycerides 06/10/2024 111     Non HDL Cholesterol 06/10/2024 83     Hemoglobin A1C 06/10/2024 5.5     Estimated Average Glucose 06/10/2024 111     Thyroid Stimulating Horm* " 06/10/2024 1.98     Color, Urine 06/10/2024 Light-Baxter (N)     Appearance, Urine 06/10/2024 Turbid (N)     Specific Gravity, Urine 06/10/2024 1.012     pH, Urine 06/10/2024 5.5     Protein, Urine 06/10/2024 10 (TRACE)     Glucose, Urine 06/10/2024 Normal     Blood, Urine 06/10/2024 0.03 (TRACE) (A)     Ketones, Urine 06/10/2024 NEGATIVE     Bilirubin, Urine 06/10/2024 NEGATIVE     Urobilinogen, Urine 06/10/2024 Normal     Nitrite, Urine 06/10/2024 NEGATIVE     Leukocyte Esterase, Urine 06/10/2024 500 Chuck/µL (A)     WBC, Urine 06/10/2024 >50 (A)     WBC Clumps, Urine 06/10/2024 FEW     RBC, Urine 06/10/2024 6-10 (A)     Bacteria, Urine 06/10/2024 2+ (A)     Mucus, Urine 06/10/2024 FEW     Hyaline Casts, Urine 06/10/2024 1+ (A)    Lab on 03/08/2024   Component Date Value    Glucose 03/08/2024 101 (H)     Sodium 03/08/2024 135 (L)     Potassium 03/08/2024 4.3     Chloride 03/08/2024 101     Bicarbonate 03/08/2024 28     Anion Gap 03/08/2024 10     Urea Nitrogen 03/08/2024 19     Creatinine 03/08/2024 0.96     eGFR 03/08/2024 60 (L)     Calcium 03/08/2024 9.5     Albumin 03/08/2024 4.0     Alkaline Phosphatase 03/08/2024 50     Total Protein 03/08/2024 7.0     AST 03/08/2024 18     Bilirubin, Total 03/08/2024 0.7     ALT 03/08/2024 15     Thyroid Stimulating Horm* 03/08/2024 2.31     Cholesterol 03/08/2024 149     HDL-Cholesterol 03/08/2024 62.2     Cholesterol/HDL Ratio 03/08/2024 2.4     LDL Calculated 03/08/2024 69     VLDL 03/08/2024 17     Triglycerides 03/08/2024 87     Non HDL Cholesterol 03/08/2024 87     Hemoglobin A1C 03/08/2024 5.9 (H)     Estimated Average Glucose 03/08/2024 123         Assessment/Plan   1.  Hypertension.  Patient is actually feeling relatively well no lightheadedness or dizziness.  Lab work from 6/10/2024 includes a normal SMA panel other than creatinine 1.45.  Will reduce her antihypertensive therapy slightly by changing from olmesartan HCT 40-25 mg daily to straight losartan 100 mg  daily.  Patient will remain on amlodipine 10 mg daily and metoprolol unchanged.  2.?  Coronary artery disease.  Diagnosis not confirmed by CT coronary calcium score of 0 when performed on 12/28/2018.  The patient did have an echocardiogram on 12/22/2017 demonstrating an LV ejection fraction of 60-65% mild aortic valve insufficiency moderate left atrial enlargement normal estimated PA systolic pressure.  Patient return in 6 months for follow-up and we will check an echocardiogram after that visit.  Routine lab work from 6/10/2024 included a CBC with hematocrit 32.4.  Glycohemoglobin 5.5%.  SMA panel normal except creatinine 1.45.  3.  Hyperlipidemia.  Very good response to rosuvastatin 20 mg daily.  Lab work 6/10/2024 includes cholesterol 139 LDL 61 HDL 55 triglyceride 111.  4.  Type 2 diabetes.  Glycohemoglobin 5.5% on 6/10/2024.  Patient is currently on metformin 500 mg twice daily which will be kept unchanged.  5.  Hypothyroidism.        Orders:  No orders of the defined types were placed in this encounter.     Followup Appts:  Future Appointments   Date Time Provider Department Center   1/17/2025  9:45 AM Xander Chen MD [id number] [facility]           ____________________________________________________________  Xander Chen MD  [facility] [place]  Assistant Clinical Professor, [employer]  [facility]

## 2024-07-12 NOTE — PATIENT INSTRUCTIONS
STOP Olmesartan - hydrochlorothiazide  Start Losartan 100 mg (1) tablet  by mouth daily   Labs done this year   Follow up in 6 months

## 2024-08-30 DIAGNOSIS — I10 HYPERTENSION, UNSPECIFIED TYPE: ICD-10-CM

## 2024-09-03 RX ORDER — AMLODIPINE BESYLATE 10 MG/1
10 TABLET ORAL DAILY
Qty: 100 TABLET | Refills: 2 | Status: SHIPPED | OUTPATIENT
Start: 2024-09-03

## 2024-09-26 ENCOUNTER — APPOINTMENT (OUTPATIENT)
Dept: SURGERY | Facility: CLINIC | Age: 79
End: 2024-09-26
Payer: COMMERCIAL

## 2024-10-02 NOTE — PROGRESS NOTES
Subjective   Patient ID: Viji Caballero is a 78 y.o. female who presents for No chief complaint on file..  HPI  5.2 YR FUV PEH/ TOUPET  Review of Systems  CONSTITUTIONAL:          Chills No.  Fatigue No.  Fever No.         CARDIOLOGY:          Negative for dizziness, chest pain, palpitations, shortness of breath.         RESPIRATORY:          Sleep Apnea No.  Negative for chest congestion, cough, wheezing.         GASTROENTEROLOGY:          Food Intolerance No.  Abdominal pain No.  Acid reflux No.  Black stools No.  Constipation No.  Diarrhea No.  Loss of appetite no.  Nausea No.  Vomiting No.         UROLOGY:          Negative for dysuria, urinary urgency, kidney stones.         PSYCHOLOGY:          Negative for depression, anxiety, high stress level.    Objective   Physical Exam    Assessment/Plan            Breanna Guerra LPN 10/02/24 2:12 PM

## 2024-10-07 ENCOUNTER — LAB (OUTPATIENT)
Dept: LAB | Facility: LAB | Age: 79
End: 2024-10-07
Payer: MEDICARE

## 2024-10-07 DIAGNOSIS — E78.00 HYPERCHOLESTEROLEMIA: ICD-10-CM

## 2024-10-07 DIAGNOSIS — I10 HYPERTENSION, UNSPECIFIED TYPE: ICD-10-CM

## 2024-10-07 DIAGNOSIS — E13.9 OTHER SPECIFIED DIABETES MELLITUS WITHOUT COMPLICATION, WITHOUT LONG-TERM CURRENT USE OF INSULIN: ICD-10-CM

## 2024-10-07 DIAGNOSIS — E03.9 HYPOTHYROIDISM, UNSPECIFIED TYPE: ICD-10-CM

## 2024-10-07 LAB
ALBUMIN SERPL BCP-MCNC: 4 G/DL (ref 3.4–5)
ALP SERPL-CCNC: 57 U/L (ref 33–136)
ALT SERPL W P-5'-P-CCNC: 9 U/L (ref 7–45)
ANION GAP SERPL CALC-SCNC: 12 MMOL/L (ref 10–20)
APPEARANCE UR: CLEAR
AST SERPL W P-5'-P-CCNC: 17 U/L (ref 9–39)
BACTERIA #/AREA URNS AUTO: ABNORMAL /HPF
BILIRUB SERPL-MCNC: 0.5 MG/DL (ref 0–1.2)
BILIRUB UR STRIP.AUTO-MCNC: NEGATIVE MG/DL
BUN SERPL-MCNC: 11 MG/DL (ref 6–23)
CALCIUM SERPL-MCNC: 9.5 MG/DL (ref 8.6–10.6)
CHLORIDE SERPL-SCNC: 104 MMOL/L (ref 98–107)
CHOLEST SERPL-MCNC: 139 MG/DL (ref 0–199)
CHOLESTEROL/HDL RATIO: 2.1
CO2 SERPL-SCNC: 29 MMOL/L (ref 21–32)
COLOR UR: YELLOW
CREAT SERPL-MCNC: 1.09 MG/DL (ref 0.5–1.05)
EGFRCR SERPLBLD CKD-EPI 2021: 51 ML/MIN/1.73M*2
ERYTHROCYTE [DISTWIDTH] IN BLOOD BY AUTOMATED COUNT: 12.5 % (ref 11.5–14.5)
EST. AVERAGE GLUCOSE BLD GHB EST-MCNC: 105 MG/DL
GLUCOSE SERPL-MCNC: 107 MG/DL (ref 74–99)
GLUCOSE UR STRIP.AUTO-MCNC: NORMAL MG/DL
HBA1C MFR BLD: 5.3 %
HCT VFR BLD AUTO: 32.3 % (ref 36–46)
HDLC SERPL-MCNC: 65.6 MG/DL
HGB BLD-MCNC: 10.7 G/DL (ref 12–16)
HYALINE CASTS #/AREA URNS AUTO: ABNORMAL /LPF
KETONES UR STRIP.AUTO-MCNC: NEGATIVE MG/DL
LDLC SERPL CALC-MCNC: 53 MG/DL
LEUKOCYTE ESTERASE UR QL STRIP.AUTO: ABNORMAL
MCH RBC QN AUTO: 33.6 PG (ref 26–34)
MCHC RBC AUTO-ENTMCNC: 33.1 G/DL (ref 32–36)
MCV RBC AUTO: 102 FL (ref 80–100)
MUCOUS THREADS #/AREA URNS AUTO: ABNORMAL /LPF
NITRITE UR QL STRIP.AUTO: NEGATIVE
NON HDL CHOLESTEROL: 73 MG/DL (ref 0–149)
NRBC BLD-RTO: 0 /100 WBCS (ref 0–0)
PH UR STRIP.AUTO: 6 [PH]
PLATELET # BLD AUTO: 286 X10*3/UL (ref 150–450)
POTASSIUM SERPL-SCNC: 3.9 MMOL/L (ref 3.5–5.3)
PROT SERPL-MCNC: 7.3 G/DL (ref 6.4–8.2)
PROT UR STRIP.AUTO-MCNC: ABNORMAL MG/DL
RBC # BLD AUTO: 3.18 X10*6/UL (ref 4–5.2)
RBC # UR STRIP.AUTO: ABNORMAL /UL
RBC #/AREA URNS AUTO: ABNORMAL /HPF
SODIUM SERPL-SCNC: 141 MMOL/L (ref 136–145)
SP GR UR STRIP.AUTO: 1.02
SQUAMOUS #/AREA URNS AUTO: ABNORMAL /HPF
TRIGL SERPL-MCNC: 100 MG/DL (ref 0–149)
TSH SERPL-ACNC: 1.14 MIU/L (ref 0.44–3.98)
UROBILINOGEN UR STRIP.AUTO-MCNC: NORMAL MG/DL
VLDL: 20 MG/DL (ref 0–40)
WBC # BLD AUTO: 6.4 X10*3/UL (ref 4.4–11.3)
WBC #/AREA URNS AUTO: >50 /HPF

## 2024-10-07 PROCEDURE — 80061 LIPID PANEL: CPT

## 2024-10-07 PROCEDURE — 36415 COLL VENOUS BLD VENIPUNCTURE: CPT

## 2024-10-07 PROCEDURE — 85027 COMPLETE CBC AUTOMATED: CPT

## 2024-10-07 PROCEDURE — 84443 ASSAY THYROID STIM HORMONE: CPT

## 2024-10-07 PROCEDURE — 83036 HEMOGLOBIN GLYCOSYLATED A1C: CPT

## 2024-10-07 PROCEDURE — 81001 URINALYSIS AUTO W/SCOPE: CPT

## 2024-10-07 PROCEDURE — 80053 COMPREHEN METABOLIC PANEL: CPT

## 2024-10-10 ENCOUNTER — APPOINTMENT (OUTPATIENT)
Dept: SURGERY | Facility: CLINIC | Age: 79
End: 2024-10-10
Payer: COMMERCIAL

## 2024-10-10 VITALS
HEART RATE: 81 BPM | HEIGHT: 60 IN | SYSTOLIC BLOOD PRESSURE: 138 MMHG | DIASTOLIC BLOOD PRESSURE: 76 MMHG | BODY MASS INDEX: 31.61 KG/M2 | WEIGHT: 161 LBS

## 2024-10-10 DIAGNOSIS — K44.9 HIATAL HERNIA: Primary | ICD-10-CM

## 2024-10-10 ASSESSMENT — COLUMBIA-SUICIDE SEVERITY RATING SCALE - C-SSRS
6. HAVE YOU EVER DONE ANYTHING, STARTED TO DO ANYTHING, OR PREPARED TO DO ANYTHING TO END YOUR LIFE?: NO
2. HAVE YOU ACTUALLY HAD ANY THOUGHTS OF KILLING YOURSELF?: NO
1. IN THE PAST MONTH, HAVE YOU WISHED YOU WERE DEAD OR WISHED YOU COULD GO TO SLEEP AND NOT WAKE UP?: NO

## 2024-10-10 ASSESSMENT — PAIN SCALES - GENERAL: PAINLEVEL: 0-NO PAIN

## 2024-10-10 NOTE — PROGRESS NOTES
Subjective   Patient ID: Viji Caballero is a 78 y.o. female who presents for No chief complaint on file..  HPI  Viji is here for her 5 year follow up from her paraesohpageal hernia. She has lost 32 pounds since her last appointment. She tells me that Dr Bunch prescribed her metformin to help with weight loss. She has no reflux. She has no dysphagia. She moves throughout her day for exercise.     Objective   Physical Exam  Constitutional:       Appearance: Normal appearance.   Eyes:      Pupils: Pupils are equal, round, and reactive to light.   Cardiovascular:      Rate and Rhythm: Normal rate.   Pulmonary:      Effort: Pulmonary effort is normal.   Abdominal:      Palpations: Abdomen is soft.   Musculoskeletal:         General: Normal range of motion.   Skin:     General: Skin is warm and dry.   Neurological:      General: No focal deficit present.      Mental Status: She is alert and oriented to person, place, and time.   Psychiatric:         Mood and Affect: Mood normal.         Assessment/Plan   Problem List Items Addressed This Visit             ICD-10-CM    Hiatal hernia - Primary K44.9     I encouraged Viji to continue with her weight loss as weight gain is a risk factor for reoccurrence. She will call the office if she develops reflux or dysphagia. She will follow up in 1 year.     Mayra Bach, SHAY-CNP 10/10/24 10:27 AM

## 2024-10-16 ENCOUNTER — OFFICE VISIT (OUTPATIENT)
Dept: PRIMARY CARE | Facility: CLINIC | Age: 79
End: 2024-10-16
Payer: MEDICARE

## 2024-10-16 VITALS
SYSTOLIC BLOOD PRESSURE: 132 MMHG | BODY MASS INDEX: 31.22 KG/M2 | DIASTOLIC BLOOD PRESSURE: 72 MMHG | HEIGHT: 60 IN | WEIGHT: 159 LBS

## 2024-10-16 DIAGNOSIS — E13.9 OTHER SPECIFIED DIABETES MELLITUS WITHOUT COMPLICATION, WITHOUT LONG-TERM CURRENT USE OF INSULIN: ICD-10-CM

## 2024-10-16 DIAGNOSIS — E78.00 HYPERCHOLESTEROLEMIA: ICD-10-CM

## 2024-10-16 DIAGNOSIS — I10 HYPERTENSION, UNSPECIFIED TYPE: ICD-10-CM

## 2024-10-16 DIAGNOSIS — N39.498 FREQUENT URINARY INCONTINENCE: ICD-10-CM

## 2024-10-16 DIAGNOSIS — Z78.0 POSTMENOPAUSAL: Primary | ICD-10-CM

## 2024-10-16 DIAGNOSIS — E03.9 HYPOTHYROIDISM, UNSPECIFIED TYPE: ICD-10-CM

## 2024-10-16 PROCEDURE — 3078F DIAST BP <80 MM HG: CPT | Performed by: INTERNAL MEDICINE

## 2024-10-16 PROCEDURE — 99213 OFFICE O/P EST LOW 20 MIN: CPT | Performed by: INTERNAL MEDICINE

## 2024-10-16 PROCEDURE — 1157F ADVNC CARE PLAN IN RCRD: CPT | Performed by: INTERNAL MEDICINE

## 2024-10-16 PROCEDURE — 3075F SYST BP GE 130 - 139MM HG: CPT | Performed by: INTERNAL MEDICINE

## 2024-10-17 NOTE — PROGRESS NOTES
"Subjective   Patient ID: Viji Caballero is a 78 y.o. female who presents for Follow-up.    Viji today came here for follow-up on various conditions.  Overall, she is a happy person.  Appetite and weight are okay.  No chest pain or shortness of breath.  Taking medications regularly.  No side effects.    I have personally reviewed the patient's Past Medical History, Medications, Allergies, Social History, and Family History in the EMR.    Review of Systems   All other systems reviewed and are negative.    Objective   /72   Ht 1.511 m (4' 11.5\")   Wt 72.1 kg (159 lb)   BMI 31.58 kg/m²     Physical Exam  Vitals reviewed.   Cardiovascular:      Heart sounds: Normal heart sounds, S1 normal and S2 normal. No murmur heard.     No friction rub.   Pulmonary:      Effort: Pulmonary effort is normal.      Breath sounds: Normal breath sounds and air entry.   Abdominal:      Palpations: There is no hepatomegaly, splenomegaly or mass.   Musculoskeletal:      Right lower leg: No edema.      Left lower leg: No edema.   Lymphadenopathy:      Lower Body: No right inguinal adenopathy. No left inguinal adenopathy.   Neurological:      Cranial Nerves: Cranial nerves 2-12 are intact.      Sensory: No sensory deficit.      Motor: Motor function is intact.      Deep Tendon Reflexes: Reflexes are normal and symmetric.     LAB WORK: Laboratory testing discussed.    Assessment/Plan   Problem List Items Addressed This Visit             ICD-10-CM       Cardiac and Vasculature    High blood pressure I10    Relevant Orders    CBC    Comprehensive Metabolic Panel    Lipid Panel    Urinalysis with Reflex Culture and Microscopic    Thyroid Stimulating Hormone    Hemoglobin A1C    Vitamin D 25-Hydroxy,Total (for eval of Vitamin D levels)    Hypercholesterolemia E78.00    Relevant Orders    CBC    Comprehensive Metabolic Panel    Lipid Panel    Urinalysis with Reflex Culture and Microscopic    Thyroid Stimulating Hormone    Hemoglobin " A1C    Vitamin D 25-Hydroxy,Total (for eval of Vitamin D levels)       Endocrine/Metabolic    Hypothyroidism E03.9    Relevant Orders    CBC    Comprehensive Metabolic Panel    Lipid Panel    Urinalysis with Reflex Culture and Microscopic    Thyroid Stimulating Hormone    Hemoglobin A1C    Vitamin D 25-Hydroxy,Total (for eval of Vitamin D levels)     Other Visit Diagnoses         Codes    Postmenopausal    -  Primary Z78.0    Other specified diabetes mellitus without complication, without long-term current use of insulin     E13.9    Relevant Orders    CBC    Comprehensive Metabolic Panel    Lipid Panel    Urinalysis with Reflex Culture and Microscopic    Thyroid Stimulating Hormone    Hemoglobin A1C    Vitamin D 25-Hydroxy,Total (for eval of Vitamin D levels)    Frequent urinary incontinence     N39.498    Relevant Orders    Urinalysis with Reflex Culture and Microscopic    Body mass index (BMI) 30.0-30.9, adult     Z68.30    Relevant Orders    Vitamin D 25-Hydroxy,Total (for eval of Vitamin D levels)        1. Hypothyroid.  Same dose of Synthroid.  2. Hypertension.  Kidney okay.  3. Cholesterol.  Monitor.  4. Type 2 diabetes.  Blood sugar okay.  5. Postmenopausal.  Mammogram.  6. She already got her flu and COVID shot.  I congratulated her.  7. Follow-up appointment in January.  Always happy to serve her anytime sooner should it be necessary.    Scribe Attestation  By signing my name below, I, Vonda Edmond, [place] attest that this documentation has been prepared under the direction and in the presence of Sean Bunch MD.

## 2025-01-15 ENCOUNTER — LAB (OUTPATIENT)
Dept: LAB | Facility: LAB | Age: 80
End: 2025-01-15
Payer: COMMERCIAL

## 2025-01-15 DIAGNOSIS — E03.9 HYPOTHYROIDISM, UNSPECIFIED TYPE: ICD-10-CM

## 2025-01-15 DIAGNOSIS — E13.9 OTHER SPECIFIED DIABETES MELLITUS WITHOUT COMPLICATION, WITHOUT LONG-TERM CURRENT USE OF INSULIN: ICD-10-CM

## 2025-01-15 DIAGNOSIS — E78.00 HYPERCHOLESTEROLEMIA: ICD-10-CM

## 2025-01-15 DIAGNOSIS — I10 HYPERTENSION, UNSPECIFIED TYPE: ICD-10-CM

## 2025-01-15 DIAGNOSIS — N39.498 FREQUENT URINARY INCONTINENCE: ICD-10-CM

## 2025-01-15 LAB
25(OH)D3 SERPL-MCNC: 61 NG/ML (ref 30–100)
ALBUMIN SERPL BCP-MCNC: 4 G/DL (ref 3.4–5)
ALP SERPL-CCNC: 55 U/L (ref 33–136)
ALT SERPL W P-5'-P-CCNC: 9 U/L (ref 7–45)
ANION GAP SERPL CALCULATED.3IONS-SCNC: 12 MMOL/L (ref 10–20)
APPEARANCE UR: ABNORMAL
AST SERPL W P-5'-P-CCNC: 16 U/L (ref 9–39)
BACTERIA #/AREA URNS AUTO: ABNORMAL /HPF
BILIRUB SERPL-MCNC: 0.7 MG/DL (ref 0–1.2)
BILIRUB UR STRIP.AUTO-MCNC: NEGATIVE MG/DL
BUN SERPL-MCNC: 17 MG/DL (ref 6–23)
CALCIUM SERPL-MCNC: 9.5 MG/DL (ref 8.6–10.3)
CHLORIDE SERPL-SCNC: 103 MMOL/L (ref 98–107)
CHOLEST SERPL-MCNC: 158 MG/DL (ref 0–199)
CHOLEST/HDLC SERPL: 2.4 {RATIO}
CO2 SERPL-SCNC: 27 MMOL/L (ref 21–32)
COLOR UR: ABNORMAL
CREAT SERPL-MCNC: 1.16 MG/DL (ref 0.5–1.05)
EGFRCR SERPLBLD CKD-EPI 2021: 47 ML/MIN/1.73M*2
ERYTHROCYTE [DISTWIDTH] IN BLOOD BY AUTOMATED COUNT: 12.4 % (ref 11.5–14.5)
EST. AVERAGE GLUCOSE BLD GHB EST-MCNC: 108 MG/DL
GLUCOSE SERPL-MCNC: 101 MG/DL (ref 74–99)
GLUCOSE UR STRIP.AUTO-MCNC: NORMAL MG/DL
HBA1C MFR BLD: 5.4 %
HCT VFR BLD AUTO: 33.8 % (ref 36–46)
HDLC SERPL-MCNC: 66.6 MG/DL
HGB BLD-MCNC: 11 G/DL (ref 12–16)
HOLD SPECIMEN: NORMAL
KETONES UR STRIP.AUTO-MCNC: NEGATIVE MG/DL
LDLC SERPL CALC-MCNC: 73 MG/DL
LEUKOCYTE ESTERASE UR QL STRIP.AUTO: ABNORMAL
MCH RBC QN AUTO: 33.4 PG (ref 26–34)
MCHC RBC AUTO-ENTMCNC: 32.5 G/DL (ref 32–36)
MCV RBC AUTO: 103 FL (ref 80–100)
MUCOUS THREADS #/AREA URNS AUTO: ABNORMAL /LPF
NITRITE UR QL STRIP.AUTO: NEGATIVE
NON HDL CHOLESTEROL: 91 MG/DL (ref 0–149)
NRBC BLD-RTO: 0 /100 WBCS (ref 0–0)
PH UR STRIP.AUTO: 5 [PH]
PLATELET # BLD AUTO: 271 X10*3/UL (ref 150–450)
POTASSIUM SERPL-SCNC: 4.1 MMOL/L (ref 3.5–5.3)
PROT SERPL-MCNC: 7.1 G/DL (ref 6.4–8.2)
PROT UR STRIP.AUTO-MCNC: NEGATIVE MG/DL
RBC # BLD AUTO: 3.29 X10*6/UL (ref 4–5.2)
RBC # UR STRIP.AUTO: NEGATIVE /UL
RBC #/AREA URNS AUTO: ABNORMAL /HPF
SODIUM SERPL-SCNC: 138 MMOL/L (ref 136–145)
SP GR UR STRIP.AUTO: 1.01
TRIGL SERPL-MCNC: 92 MG/DL (ref 0–149)
TSH SERPL-ACNC: 3.03 MIU/L (ref 0.44–3.98)
UROBILINOGEN UR STRIP.AUTO-MCNC: NORMAL MG/DL
VLDL: 18 MG/DL (ref 0–40)
WBC # BLD AUTO: 5.5 X10*3/UL (ref 4.4–11.3)
WBC #/AREA URNS AUTO: >50 /HPF

## 2025-01-15 PROCEDURE — 82306 VITAMIN D 25 HYDROXY: CPT

## 2025-01-15 PROCEDURE — 80061 LIPID PANEL: CPT

## 2025-01-15 PROCEDURE — 80053 COMPREHEN METABOLIC PANEL: CPT

## 2025-01-15 PROCEDURE — 83036 HEMOGLOBIN GLYCOSYLATED A1C: CPT

## 2025-01-15 PROCEDURE — 85027 COMPLETE CBC AUTOMATED: CPT

## 2025-01-15 PROCEDURE — 84443 ASSAY THYROID STIM HORMONE: CPT

## 2025-01-15 PROCEDURE — 81001 URINALYSIS AUTO W/SCOPE: CPT

## 2025-01-15 PROCEDURE — 87086 URINE CULTURE/COLONY COUNT: CPT

## 2025-01-15 PROCEDURE — 87186 SC STD MICRODIL/AGAR DIL: CPT

## 2025-01-16 DIAGNOSIS — I10 HYPERTENSION, UNSPECIFIED TYPE: ICD-10-CM

## 2025-01-16 RX ORDER — METOPROLOL SUCCINATE 100 MG/1
100 TABLET, EXTENDED RELEASE ORAL DAILY
Qty: 100 TABLET | Refills: 2 | Status: SHIPPED | OUTPATIENT
Start: 2025-01-16

## 2025-01-17 ENCOUNTER — OFFICE VISIT (OUTPATIENT)
Dept: CARDIOLOGY | Facility: CLINIC | Age: 80
End: 2025-01-17
Payer: COMMERCIAL

## 2025-01-17 VITALS
WEIGHT: 159.5 LBS | OXYGEN SATURATION: 98 % | SYSTOLIC BLOOD PRESSURE: 124 MMHG | DIASTOLIC BLOOD PRESSURE: 80 MMHG | BODY MASS INDEX: 31.31 KG/M2 | HEART RATE: 88 BPM | HEIGHT: 60 IN

## 2025-01-17 DIAGNOSIS — R00.2 HEART PALPITATIONS: Primary | ICD-10-CM

## 2025-01-17 DIAGNOSIS — I48.91 ATRIAL FIBRILLATION, UNSPECIFIED TYPE (MULTI): ICD-10-CM

## 2025-01-17 LAB — BACTERIA UR CULT: ABNORMAL

## 2025-01-17 PROCEDURE — 1157F ADVNC CARE PLAN IN RCRD: CPT | Performed by: INTERNAL MEDICINE

## 2025-01-17 PROCEDURE — 99214 OFFICE O/P EST MOD 30 MIN: CPT | Performed by: INTERNAL MEDICINE

## 2025-01-17 PROCEDURE — 3074F SYST BP LT 130 MM HG: CPT | Performed by: INTERNAL MEDICINE

## 2025-01-17 PROCEDURE — 3078F DIAST BP <80 MM HG: CPT | Performed by: INTERNAL MEDICINE

## 2025-01-17 PROCEDURE — 93005 ELECTROCARDIOGRAM TRACING: CPT | Performed by: INTERNAL MEDICINE

## 2025-01-17 PROCEDURE — 1036F TOBACCO NON-USER: CPT | Performed by: INTERNAL MEDICINE

## 2025-01-17 PROCEDURE — 1160F RVW MEDS BY RX/DR IN RCRD: CPT | Performed by: INTERNAL MEDICINE

## 2025-01-17 PROCEDURE — 1126F AMNT PAIN NOTED NONE PRSNT: CPT | Performed by: INTERNAL MEDICINE

## 2025-01-17 PROCEDURE — 1159F MED LIST DOCD IN RCRD: CPT | Performed by: INTERNAL MEDICINE

## 2025-01-17 ASSESSMENT — PAIN SCALES - GENERAL: PAINLEVEL_OUTOF10: 0-NO PAIN

## 2025-01-17 ASSESSMENT — PATIENT HEALTH QUESTIONNAIRE - PHQ9
1. LITTLE INTEREST OR PLEASURE IN DOING THINGS: NOT AT ALL
SUM OF ALL RESPONSES TO PHQ9 QUESTIONS 1 AND 2: 0
2. FEELING DOWN, DEPRESSED OR HOPELESS: NOT AT ALL

## 2025-01-17 NOTE — PROGRESS NOTES
Primary Care Physician: Sean Bunch MD  Date of Visit: 01/17/2025  9:45 AM EST  Location of visit: [facility] [place] [address]     Chief Complaint:   Chief Complaint   Patient presents with    Follow-up    Hypertension     HPI / Summary:   Viji Caballero is a 79 y.o. female presents for follow up     ROS    Medical History:   She has a past medical history of Arthritis, Diabetes mellitus (Multi), GERD (gastroesophageal reflux disease), High cholesterol, Hypertension, Personal history of other specified conditions (12/05/2018), and Postmenopausal.  Surgical Hx:   She has a past surgical history that includes Appendectomy; Paraesophageal hernia repair (07/29/2019); and Ovarian cyst removal.   Social Hx:   She reports that she has never smoked. She has never used smokeless tobacco. She reports that she does not drink alcohol and does not use drugs.  Family Hx:   Her family history includes Diabetes in her mother and another family member; Hypertension in an other family member; Lung cancer in her father; Stroke in her mother.   Allergies:  No Known Allergies  Outpatient Medications:  Current Outpatient Medications   Medication Instructions    amLODIPine (NORVASC) 10 mg, oral, Daily    calcium carbonate 600 mg calcium (1,500 mg) tablet 1 tablet, Daily    cholecalciferol (Vitamin D-3) 5,000 Units tablet 1 tablet, Daily    levothyroxine (SYNTHROID, LEVOXYL) 50 mcg, oral, Daily    loratadine (Claritin) 10 mg tablet 1 tablet, Daily    losartan (COZAAR) 100 mg, oral, Daily    metFORMIN (GLUCOPHAGE) 500 mg, oral, 2 times daily (morning and late afternoon)    metoprolol succinate XL (TOPROL-XL) 100 mg, oral, Daily    multivitamin-calcium carb (Flintstones Plus Calcium) tablet,chewable 1 tablet, Daily    rosuvastatin (CRESTOR) 20 mg, oral, Daily     Physical Exam:  Vitals:    01/17/25 0943   BP: 129/75   BP Location: Left arm   Patient Position: Sitting   BP Cuff Size: Adult   Pulse: 83   SpO2: 98%   Weight: 72.3 kg (159 lb 8 oz)  "  Height: 1.511 m (4' 11.5\")     Wt Readings from Last 5 Encounters:   01/17/25 72.3 kg (159 lb 8 oz)   10/16/24 72.1 kg (159 lb)   10/10/24 73 kg (161 lb)   07/12/24 78 kg (171 lb 14.4 oz)   06/14/24 79.8 kg (176 lb)     Physical Exam  JVP not elevated. Carotid impulses are 2+ without overlying bruit.   Chest exhibits fair to good air movement with completely clear breath sounds.   The cardiac rhythm is regular with no premature beats.   Normal S1 and S2. No gallop, murmur or rub, or click.   Abdomen is soft and benign without focal tenderness.   With no lower leg edema. The pedal pulses are intact.     Last Labs:  Lab on 01/15/2025   Component Date Value    WBC 01/15/2025 5.5     nRBC 01/15/2025 0.0     RBC 01/15/2025 3.29 (L)     Hemoglobin 01/15/2025 11.0 (L)     Hematocrit 01/15/2025 33.8 (L)     MCV 01/15/2025 103 (H)     MCH 01/15/2025 33.4     MCHC 01/15/2025 32.5     RDW 01/15/2025 12.4     Platelets 01/15/2025 271     Glucose 01/15/2025 101 (H)     Sodium 01/15/2025 138     Potassium 01/15/2025 4.1     Chloride 01/15/2025 103     Bicarbonate 01/15/2025 27     Anion Gap 01/15/2025 12     Urea Nitrogen 01/15/2025 17     Creatinine 01/15/2025 1.16 (H)     eGFR 01/15/2025 47 (L)     Calcium 01/15/2025 9.5     Albumin 01/15/2025 4.0     Alkaline Phosphatase 01/15/2025 55     Total Protein 01/15/2025 7.1     AST 01/15/2025 16     Bilirubin, Total 01/15/2025 0.7     ALT 01/15/2025 9     Cholesterol 01/15/2025 158     HDL-Cholesterol 01/15/2025 66.6     Cholesterol/HDL Ratio 01/15/2025 2.4     LDL Calculated 01/15/2025 73     VLDL 01/15/2025 18     Triglycerides 01/15/2025 92     Non HDL Cholesterol 01/15/2025 91     Thyroid Stimulating Horm* 01/15/2025 3.03     Hemoglobin A1C 01/15/2025 5.4     Estimated Average Glucose 01/15/2025 108     Vitamin D, 25-Hydroxy, T* 01/15/2025 61     Color, Urine 01/15/2025 Light-Yellow     Appearance, Urine 01/15/2025 Turbid (N)     Specific Gravity, Urine 01/15/2025 1.009     pH, " Urine 01/15/2025 5.0     Protein, Urine 01/15/2025 NEGATIVE     Glucose, Urine 01/15/2025 Normal     Blood, Urine 01/15/2025 NEGATIVE     Ketones, Urine 01/15/2025 NEGATIVE     Bilirubin, Urine 01/15/2025 NEGATIVE     Urobilinogen, Urine 01/15/2025 Normal     Nitrite, Urine 01/15/2025 NEGATIVE     Leukocyte Esterase, Urine 01/15/2025 500 Chuck/uL (A)     Extra Tube 01/15/2025 Hold for add-ons.     WBC, Urine 01/15/2025 >50 (A)     RBC, Urine 01/15/2025 6-10 (A)     Bacteria, Urine 01/15/2025 3+ (A)     Mucus, Urine 01/15/2025 FEW     Urine Culture 01/15/2025 >=100,000 CFU/mL Enteric bacilli (A)    Lab on 10/07/2024   Component Date Value    WBC 10/07/2024 6.4     nRBC 10/07/2024 0.0     RBC 10/07/2024 3.18 (L)     Hemoglobin 10/07/2024 10.7 (L)     Hematocrit 10/07/2024 32.3 (L)     MCV 10/07/2024 102 (H)     MCH 10/07/2024 33.6     MCHC 10/07/2024 33.1     RDW 10/07/2024 12.5     Platelets 10/07/2024 286     Glucose 10/07/2024 107 (H)     Sodium 10/07/2024 141     Potassium 10/07/2024 3.9     Chloride 10/07/2024 104     Bicarbonate 10/07/2024 29     Anion Gap 10/07/2024 12     Urea Nitrogen 10/07/2024 11     Creatinine 10/07/2024 1.09 (H)     eGFR 10/07/2024 51 (L)     Calcium 10/07/2024 9.5     Albumin 10/07/2024 4.0     Alkaline Phosphatase 10/07/2024 57     Total Protein 10/07/2024 7.3     AST 10/07/2024 17     Bilirubin, Total 10/07/2024 0.5     ALT 10/07/2024 9     Cholesterol 10/07/2024 139     HDL-Cholesterol 10/07/2024 65.6     Cholesterol/HDL Ratio 10/07/2024 2.1     LDL Calculated 10/07/2024 53     VLDL 10/07/2024 20     Triglycerides 10/07/2024 100     Non HDL Cholesterol 10/07/2024 73     Hemoglobin A1C 10/07/2024 5.3     Estimated Average Glucose 10/07/2024 105     Thyroid Stimulating Horm* 10/07/2024 1.14     Color, Urine 10/07/2024 Yellow     Appearance, Urine 10/07/2024 Clear     Specific Gravity, Urine 10/07/2024 1.017     pH, Urine 10/07/2024 6.0     Protein, Urine 10/07/2024 20 (TRACE)     Glucose,  Urine 10/07/2024 Normal     Blood, Urine 10/07/2024 0.03 (TRACE) (A)     Ketones, Urine 10/07/2024 NEGATIVE     Bilirubin, Urine 10/07/2024 NEGATIVE     Urobilinogen, Urine 10/07/2024 Normal     Nitrite, Urine 10/07/2024 NEGATIVE     Leukocyte Esterase, Urine 10/07/2024 250 Chuck/µL (A)     WBC, Urine 10/07/2024 >50 (A)     RBC, Urine 10/07/2024 3-5     Squamous Epithelial Cell* 10/07/2024 1-9 (SPARSE)     Bacteria, Urine 10/07/2024 4+ (A)     Mucus, Urine 10/07/2024 FEW     Hyaline Casts, Urine 10/07/2024 OCCASIONAL (A)         Assessment/Plan   1.  Hypertension.  Patient is actually feeling relatively well no lightheadedness or dizziness.  Lab work from 6/10/2024 includes a normal SMA panel other than creatinine 1.45.  Will reduce her antihypertensive therapy slightly by changing from olmesartan HCT 40-25 mg daily to straight losartan 100 mg daily.  Patient will remain on amlodipine 10 mg daily and metoprolol unchanged.  2.?  Coronary artery disease.  Diagnosis not confirmed by CT coronary calcium score of 0 when performed on 12/28/2018.  The patient did have an echocardiogram on 12/22/2017 demonstrating an LV ejection fraction of 60-65% mild aortic valve insufficiency moderate left atrial enlargement normal estimated PA systolic pressure.  Patient return in 6 months for follow-up and we will check an echocardiogram after that visit.  Routine lab work from 6/10/2024 included a CBC with hematocrit 32.4.  Glycohemoglobin 5.5%.  SMA panel normal except creatinine 1.45.  3.  Hyperlipidemia.  Very good response to rosuvastatin 20 mg daily.  Lab work 6/10/2024 includes cholesterol 139 LDL 61 HDL 55 triglyceride 111.  Lab work from 1/15/2025 includes cholesterol 158 LDL 73 HDL 66 triglyceride 92.  4.  Type 2 diabetes.  Glycohemoglobin 5.5% on 6/10/2024.  Lab work 1/15/2025 includes a glycohemoglobin of 5.4%.  Patient is currently on metformin 500 mg twice daily which will be kept unchanged.  Additional lab work from  1/15/2025 included CBC with hematocrit 33.8, normal SMA panel with creatinine 1.16, TSH 3.03 vitamin D level 61.  Urine C&S is positive gram-negative rods she will follow-up with internal medicine.  5.  Hypothyroidism.  6.  Atrial fibrillation.  Patient detected to have irregular heart cardiac rhythm on exam office visit 1/17/2025.  EKG confirmed therapy presence of new onset atrial fibrillation with occasional PVCs.  The ventricular rate is already controlled with the Toprol- mg daily.  She will be started on Eliquis 5 mg twice daily.  No plans at this point to pursue electrical cardioversion.        Orders:  No orders of the defined types were placed in this encounter.     Followup Appts:  Future Appointments   Date Time Provider Department Center   10/8/2025 10:00 AM SHAY Villegas-CNP [id number]           ____________________________________________________________  Xander Chen MD  [facility]  Assistant Clinical Professor, [employer]  [facility]

## 2025-01-20 ENCOUNTER — TELEPHONE (OUTPATIENT)
Dept: CARDIOLOGY | Facility: CLINIC | Age: 80
End: 2025-01-20

## 2025-01-20 ENCOUNTER — OFFICE VISIT (OUTPATIENT)
Dept: PRIMARY CARE | Facility: CLINIC | Age: 80
End: 2025-01-20
Payer: MEDICARE

## 2025-01-20 VITALS
SYSTOLIC BLOOD PRESSURE: 128 MMHG | BODY MASS INDEX: 32.25 KG/M2 | WEIGHT: 160 LBS | HEIGHT: 59 IN | DIASTOLIC BLOOD PRESSURE: 72 MMHG

## 2025-01-20 DIAGNOSIS — I10 HYPERTENSION, UNSPECIFIED TYPE: ICD-10-CM

## 2025-01-20 DIAGNOSIS — E78.00 HYPERCHOLESTEROLEMIA: ICD-10-CM

## 2025-01-20 DIAGNOSIS — D22.9 BENIGN MOLE: ICD-10-CM

## 2025-01-20 DIAGNOSIS — L81.2 FRECKLES: ICD-10-CM

## 2025-01-20 DIAGNOSIS — Z78.0 POSTMENOPAUSAL: ICD-10-CM

## 2025-01-20 DIAGNOSIS — E03.9 HYPOTHYROIDISM, UNSPECIFIED TYPE: ICD-10-CM

## 2025-01-20 DIAGNOSIS — N39.498 FREQUENT URINARY INCONTINENCE: ICD-10-CM

## 2025-01-20 DIAGNOSIS — N39.0 URINARY TRACT INFECTION WITHOUT HEMATURIA, SITE UNSPECIFIED: Primary | ICD-10-CM

## 2025-01-20 DIAGNOSIS — I48.91 UNSPECIFIED ATRIAL FIBRILLATION (MULTI): Primary | ICD-10-CM

## 2025-01-20 DIAGNOSIS — E13.9 OTHER SPECIFIED DIABETES MELLITUS WITHOUT COMPLICATION, WITHOUT LONG-TERM CURRENT USE OF INSULIN: ICD-10-CM

## 2025-01-20 PROCEDURE — 1157F ADVNC CARE PLAN IN RCRD: CPT | Performed by: INTERNAL MEDICINE

## 2025-01-20 PROCEDURE — 3074F SYST BP LT 130 MM HG: CPT | Performed by: INTERNAL MEDICINE

## 2025-01-20 PROCEDURE — 99214 OFFICE O/P EST MOD 30 MIN: CPT | Performed by: INTERNAL MEDICINE

## 2025-01-20 PROCEDURE — 1159F MED LIST DOCD IN RCRD: CPT | Performed by: INTERNAL MEDICINE

## 2025-01-20 PROCEDURE — 3078F DIAST BP <80 MM HG: CPT | Performed by: INTERNAL MEDICINE

## 2025-01-20 RX ORDER — CIPROFLOXACIN 500 MG/1
500 TABLET ORAL 2 TIMES DAILY
Qty: 20 TABLET | Refills: 0 | Status: SHIPPED | OUTPATIENT
Start: 2025-01-20 | End: 2025-01-30

## 2025-01-20 ASSESSMENT — ENCOUNTER SYMPTOMS
DEPRESSION: 0
OCCASIONAL FEELINGS OF UNSTEADINESS: 0
LOSS OF SENSATION IN FEET: 0

## 2025-01-21 NOTE — PROGRESS NOTES
"Subjective   Patient ID: Viji Caballero is a 79 y.o. female who presents for Follow-up.    This young lady today came here for multiple medical issues.  1. Increased frequency, lower abdominal pressure, UTI symptoms going on for last several days.  Over-the-counter medications not helping.  2. She saw Dr. Chen.  Feeling okay.  No problem.  She had blood work taken.  She came for follow-up.    I have personally reviewed the patient's Past Medical History, Medications, Allergies, Social History, and Family History in the EMR.    Review of Systems   All other systems reviewed and are negative.    Objective   /72   Ht 1.499 m (4' 11\")   Wt 72.6 kg (160 lb)   BMI 32.32 kg/m²     Physical Exam  Vitals reviewed.   Cardiovascular:      Heart sounds: Normal heart sounds, S1 normal and S2 normal. No murmur heard.     No friction rub.   Pulmonary:      Effort: Pulmonary effort is normal.      Breath sounds: Normal breath sounds and air entry.   Abdominal:      Palpations: There is no hepatomegaly, splenomegaly or mass.   Musculoskeletal:      Right lower leg: No edema.      Left lower leg: No edema.   Lymphadenopathy:      Lower Body: No right inguinal adenopathy. No left inguinal adenopathy.   Neurological:      Cranial Nerves: Cranial nerves 2-12 are intact.      Sensory: No sensory deficit.      Motor: Motor function is intact.      Deep Tendon Reflexes: Reflexes are normal and symmetric.     LAB WORK: Laboratory testing discussed.    Assessment/Plan   Problem List Items Addressed This Visit             ICD-10-CM       Cardiac and Vasculature    High blood pressure I10    Relevant Orders    CBC    Hypercholesterolemia E78.00    Relevant Orders    Comprehensive Metabolic Panel    Lipid Panel       Endocrine/Metabolic    Hypothyroidism E03.9    Relevant Orders    Thyroid Stimulating Hormone     Other Visit Diagnoses         Codes    Urinary tract infection without hematuria, site unspecified    -  Primary " N39.0    Other specified diabetes mellitus without complication, without long-term current use of insulin     E13.9    Relevant Orders    Hemoglobin A1C    Frequent urinary incontinence     N39.498    Relevant Medications    ciprofloxacin (Cipro) 500 mg tablet    Postmenopausal     Z78.0    Benign mole     D22.9    Freckles     L81.2        1. UTI, largely asymptomatic.  I gave Cipro.  2. Type 2 diabetes.  Hemoglobin A1c is perfect.  3. Hypertension, excellent.  4. Cholesterol, excellent.  5. Postmenopausal.  Take calcium.  I reduced the dose of vitamin D, she does not need that much.  6. Rest of blood work okay.  7. Skin, moles and freckles, okay.  8. Follow-up in four months.  Happy to see her anytime sooner if necessary.    Scribe Attestation  By signing my name below, IVonda Scribe attest that this documentation has been prepared under the direction and in the presence of Sean Bunch MD.   All medical record entries made by the scribe were personally dictated by me I have reviewed the chart and agree the record accurately reflects my personal performance of his history physical examination and management

## 2025-01-22 ENCOUNTER — SPECIALTY PHARMACY (OUTPATIENT)
Dept: PHARMACY | Facility: CLINIC | Age: 80
End: 2025-01-22

## 2025-01-22 DIAGNOSIS — I48.91 ATRIAL FIBRILLATION, UNSPECIFIED TYPE (MULTI): Primary | ICD-10-CM

## 2025-01-22 LAB
ATRIAL RATE: 86 BPM
Q ONSET: 224 MS
QRS COUNT: 11 BEATS
QRS DURATION: 82 MS
QT INTERVAL: 384 MS
QTC CALCULATION(BAZETT): 423 MS
QTC FREDERICIA: 410 MS
R AXIS: -7 DEGREES
T AXIS: -8 DEGREES
T OFFSET: 416 MS
VENTRICULAR RATE: 73 BPM

## 2025-01-28 ENCOUNTER — TELEPHONE (OUTPATIENT)
Dept: CARDIOLOGY | Facility: CLINIC | Age: 80
End: 2025-01-28
Payer: MEDICARE

## 2025-02-24 NOTE — PROGRESS NOTES
Pharmacist Clinic: Cardiology Management    Viji Caballero is a 79 y.o. female was referred to Clinical Pharmacy Team for Anticoagulation management.     Referring Provider: Xander Chen MD    THIS IS A NEW PATIENT APPOINTMENT. PATIENT WILL BE ESTABLISHING CARE WITH CLINICAL PHARMACY.    Appointment was completed by Moe (Spouse) who was reached at primary number.    Allergies Reviewed? Yes    No Known Allergies    Past Medical History:   Diagnosis Date    Arthritis     Diabetes mellitus (Multi)     GERD (gastroesophageal reflux disease)     High cholesterol     Hypertension     Personal history of other specified conditions 12/05/2018    History of lump of left breast    Postmenopausal        Current Outpatient Medications on File Prior to Visit   Medication Sig Dispense Refill    amLODIPine (Norvasc) 10 mg tablet TAKE 1 TABLET BY MOUTH DAILY 100 tablet 2    apixaban (Eliquis) 5 mg tablet Take 1 tablet (5 mg) by mouth 2 times a day. 180 tablet 3    cholecalciferol (Vitamin D-3) 5,000 Units tablet Take 1 tablet (5,000 Units) by mouth once daily.      levothyroxine (Synthroid, Levoxyl) 50 mcg tablet TAKE 1 TABLET BY MOUTH ONCE  DAILY 100 tablet 0    losartan (Cozaar) 100 mg tablet Take 1 tablet (100 mg) by mouth once daily. 90 tablet 3    metFORMIN (Glucophage) 500 mg tablet TAKE 1 TABLET BY MOUTH TWICE  DAILY WITH MEALS 200 tablet 0    metoprolol succinate XL (Toprol-XL) 100 mg 24 hr tablet TAKE 1 TABLET BY MOUTH DAILY 100 tablet 2    multivitamin-calcium carb (Flintstones Plus Calcium) tablet,chewable Chew 1 tablet once daily.      rosuvastatin (Crestor) 20 mg tablet TAKE 1 TABLET BY MOUTH DAILY 90 tablet 0    [DISCONTINUED] loratadine (Claritin) 10 mg tablet Take 1 tablet (10 mg) by mouth once daily.      [DISCONTINUED] calcium carbonate 600 mg calcium (1,500 mg) tablet Take 1 tablet (1,500 mg) by mouth once daily.       No current facility-administered medications on file prior to visit.  "        RELEVANT LAB RESULTS:  Lab Results   Component Value Date    BILITOT 0.7 01/15/2025    CALCIUM 9.5 01/15/2025    CO2 27 01/15/2025     01/15/2025    CREATININE 1.16 (H) 01/15/2025    GLUCOSE 101 (H) 01/15/2025    ALKPHOS 55 01/15/2025    K 4.1 01/15/2025    PROT 7.1 01/15/2025     01/15/2025    AST 16 01/15/2025    ALT 9 01/15/2025    BUN 17 01/15/2025    ANIONGAP 12 01/15/2025    MG 1.93 05/03/2022    ALBUMIN 4.0 01/15/2025    GFRF 59 (A) 01/04/2023     Lab Results   Component Value Date    TRIG 92 01/15/2025    CHOL 158 01/15/2025    LDLCALC 73 01/15/2025    HDL 66.6 01/15/2025     No results found for: \"BMCBC\", \"CBCDIF\"     PHARMACEUTICAL ASSESSMENT:    MEDICATION RECONCILIATION    Home Pharmacy Reviewed? Yes, describe: [facility]    Added:  -  n one  Changed:  - calcium carbonate, Claritin  Removed:  - none    Drug Interactions? No    Medication Documentation Review Audit       Reviewed by Cliff Sin, PharmD (Pharmacist) on 02/25/25 at 1026      Medication Order Taking? Sig Documenting Provider Last Dose Status   amLODIPine (Norvasc) 10 mg tablet [id number] Yes TAKE 1 TABLET BY MOUTH DAILY Xander Chen MD Taking Active   apixaban (Eliquis) 5 mg tablet [id number] Yes Take 1 tablet (5 mg) by mouth 2 times a day. Xander Chen MD  Active   Discontinued 02/25/25 1024   cholecalciferol (Vitamin D-3) 5,000 Units tablet [id number] Yes Take 1 tablet (5,000 Units) by mouth once daily. Historical Provider, MD Taking Active   levothyroxine (Synthroid, Levoxyl) 50 mcg tablet [id number] Yes TAKE 1 TABLET BY MOUTH ONCE  DAILY Sean Bunch MD  Active   Discontinued 02/25/25 1025   losartan (Cozaar) 100 mg tablet [id number] Yes Take 1 tablet (100 mg) by mouth once daily. Xander Chen MD Taking Active   metFORMIN (Glucophage) 500 mg tablet [id number] Yes TAKE 1 TABLET BY MOUTH TWICE  DAILY WITH MEALS Sean Bunch MD  Active   metoprolol succinate XL (Toprol-XL) 100 mg 24 hr tablet [id number] Yes " TAKE 1 TABLET BY MOUTH DAILY Xander Chen MD  Active   multivitamin-calcium carb (Flintstones Plus Calcium) tablet,chewable [id number] Yes Chew 1 tablet once daily. Historical Provider, MD Taking Active   rosuvastatin (Crestor) 20 mg tablet [id number] Yes TAKE 1 TABLET BY MOUTH DAILY Sean Bunch MD Taking Active                    DISEASE MANAGEMENT ASSESSMENT:     ANTICOAGULATION ASSESSMENT    The ASCVD Risk score (Akilah BROWN, et al., 2019) failed to calculate for the following reasons:    The 2019 ASCVD risk score is only valid for ages 40 to 79    DIAGNOSIS: prevention of nonvalvular atrial fibrilliation stroke and systemic embolism  - Patient is projected to be on anticoagulation indefinitely  - [id number] Score: [6] (only included if diagnosis is atrial fibrillation)   Age: [<65 (0)] [65-74 (+1)] [> 75 (+2)]: 2  Sex: [Male/Female (+1)]: 1  CHF history: [No/Yes(+1)]: 0  Hypertension history: [No/Yes(+1)]: 1  Stroke/TIA/thromboembolism history: [No/Yes(+2)]: 2  Vascular disease history (prior MI, peripheral artery disease, aortic plaque): [No/Yes(+1)]: 0  Diabetes history: [No/Yes(+1)]: 0    CURRENT PHARMACOTHERAPY:    Eliquis 5mg twice daily  Scr 1.16mg/dl  79 yoa  Weighs 72.6kg    Affordability/Accessibility: applying for [facility]  Adherence/Organization: reports adherence  Adverse Reactions: none reported  Recent Hospitalizations: none reported  Recent Falls/Trauma: none reported  Changes in Tobacco or Alcohol Intake:   Tobacco: does not use  Alcohol: 1 glass of wine on occasion    EDUCATION/COUNSELING:   - Counseled patient on MOA, expectations, duration of therapy, contraindications, administration, and monitoring parameters  - Counseled patient of side effects that are indicative of bleeding such as dark tarry stool, unexplainable bruising, or vomiting up a coffee ground like substance    DISCUSSION/NOTES:   Reports adherence to Eliquis at today's visit. No bruising or bleeding reported. Noted she  still has 10 days left of samples.  Reports income under limit for  PAP will apply at this time. Understands refills will be through  pharmacy once approved.    ASSESSMENT:    Assessment/Plan   Problem List Items Addressed This Visit       Venous thrombosis - Primary     No dose adjustments needed to Eliquis at today's visit based on their age, weight, and kidney function.           Other Visit Diagnoses       Unspecified atrial fibrillation (Multi)        Relevant Orders    Referral to Clinical Pharmacy           Patient Assistance Program (PAP)    Application for program to be submitted for the following medications: [facility]    [facility] Permanent Address: [place]   Prescription Insurance:   Yes   Members of Household: 2   Files Taxes: Yes       Patient will be faxing financial information to pharmacist directly at [phone].    Patient verbally reports monthly or yearly income which is less than 400% federal poverty level    Patient aware this process may take up to 6 weeks.     If approved medication must be filled through [facility] and MEDICATION WILL BE MAILED TO PATIENT.         RECOMMENDATIONS/PLAN:    CONTINUE  Eliquis 5mg BID    Last Appnt with Referring Provider: 1/17/25  Next Appnt with Referring Provider: 7/18/25  Clinical Pharmacist follow up: 3/25/25  Type of Encounter: David Sin PharmD    Verbal consent to manage patient's drug therapy was obtained from an individual authorized to act on behalf of a patient. They were informed they may decline to participate or withdraw from participation in pharmacy services at any time.    Continue all meds under the continuation of care with the referring provider and clinical pharmacy team.

## 2025-02-25 ENCOUNTER — APPOINTMENT (OUTPATIENT)
Dept: PHARMACY | Facility: HOSPITAL | Age: 80
End: 2025-02-25
Payer: MEDICARE

## 2025-02-25 DIAGNOSIS — I48.91 ATRIAL FIBRILLATION, UNSPECIFIED TYPE (MULTI): Primary | ICD-10-CM

## 2025-02-25 DIAGNOSIS — I82.90 VENOUS THROMBOSIS: Primary | ICD-10-CM

## 2025-02-25 DIAGNOSIS — I48.91 UNSPECIFIED ATRIAL FIBRILLATION (MULTI): ICD-10-CM

## 2025-02-25 NOTE — Clinical Note
No side effects noted with Eliquis. Still has 10 days of samples left. Applying for  PAP should be approved and receive her first fill before running out.

## 2025-03-03 ENCOUNTER — TELEPHONE (OUTPATIENT)
Dept: PHARMACY | Facility: HOSPITAL | Age: 80
End: 2025-03-03
Payer: MEDICARE

## 2025-03-03 PROCEDURE — RXMED WILLOW AMBULATORY MEDICATION CHARGE

## 2025-03-03 NOTE — TELEPHONE ENCOUNTER
Patient Assistance Program Approval:     We are pleased to inform you that your application for assistance has been approved.     This approval is valid through  3/3/26  as long as the following criteria continue to be satisfied:     Your medication (Eliquis) remains covered under your current insurance plan.   Your prescriber does not discontinue therapy.   You do not seek reimbursement from any other private or government-funded programs for the  medication.    Under this program, the pharmacy will first bill your insurance plan for your indemnified specified medication. The [employer] Assistance Fund will then offset your copay balance, so that your out-of pocket expense for your specialty medication will be $0.00.    Cliff Sin, PharmD

## 2025-03-04 ENCOUNTER — PHARMACY VISIT (OUTPATIENT)
Dept: PHARMACY | Facility: CLINIC | Age: 80
End: 2025-03-04
Payer: COMMERCIAL

## 2025-03-25 ENCOUNTER — APPOINTMENT (OUTPATIENT)
Dept: PHARMACY | Facility: HOSPITAL | Age: 80
End: 2025-03-25
Payer: MEDICARE

## 2025-03-25 DIAGNOSIS — I48.91 UNSPECIFIED ATRIAL FIBRILLATION (MULTI): ICD-10-CM

## 2025-03-25 DIAGNOSIS — I82.90 VENOUS THROMBOSIS: Primary | ICD-10-CM

## 2025-03-25 NOTE — PROGRESS NOTES
Pharmacist Clinic: Cardiology Management    Viji Caballero is a 79 y.o. female was referred to Clinical Pharmacy Team for Anticoagulation management.     Referring Provider: Xander Chen MD    THIS IS A FOLLOW UP PATIENT APPOINTMENT. AT LAST VISIT ON 2/25/25 WITH PHARMACIST (Cliff Sin).    Appointment was completed by Viji who was reached at primary number.    REVIEW OF PAST APPNT (IF APPLICABLE):   During last appointment Viji was screened and approved for  PAP to cover the cost of Eliquis with a renewal date of 3/3/26.  During last appointment patient's  reported she was adherent to the medication.    No Known Allergies    Past Medical History:   Diagnosis Date    Arthritis     Diabetes mellitus (Multi)     GERD (gastroesophageal reflux disease)     High cholesterol     Hypertension     Personal history of other specified conditions 12/05/2018    History of lump of left breast    Postmenopausal        Current Outpatient Medications on File Prior to Visit   Medication Sig Dispense Refill    amLODIPine (Norvasc) 10 mg tablet TAKE 1 TABLET BY MOUTH DAILY 100 tablet 2    apixaban (Eliquis) 5 mg tablet Take 1 tablet (5 mg) by mouth 2 times a day. 180 tablet 3    cholecalciferol (Vitamin D-3) 5,000 Units tablet Take 1 tablet (5,000 Units) by mouth once daily.      levothyroxine (Synthroid, Levoxyl) 50 mcg tablet TAKE 1 TABLET BY MOUTH ONCE  DAILY 100 tablet 0    losartan (Cozaar) 100 mg tablet Take 1 tablet (100 mg) by mouth once daily. 90 tablet 3    metFORMIN (Glucophage) 500 mg tablet TAKE 1 TABLET BY MOUTH TWICE  DAILY WITH MEALS 200 tablet 0    metoprolol succinate XL (Toprol-XL) 100 mg 24 hr tablet TAKE 1 TABLET BY MOUTH DAILY 100 tablet 2    multivitamin-calcium carb (Flintstones Plus Calcium) tablet,chewable Chew 1 tablet once daily.      rosuvastatin (Crestor) 20 mg tablet TAKE 1 TABLET BY MOUTH DAILY 90 tablet 0     No current facility-administered medications on file prior  "to visit.         RELEVANT LAB RESULTS:  Lab Results   Component Value Date    BILITOT 0.7 01/15/2025    CALCIUM 9.5 01/15/2025    CO2 27 01/15/2025     01/15/2025    CREATININE 1.16 (H) 01/15/2025    GLUCOSE 101 (H) 01/15/2025    ALKPHOS 55 01/15/2025    K 4.1 01/15/2025    PROT 7.1 01/15/2025     01/15/2025    AST 16 01/15/2025    ALT 9 01/15/2025    BUN 17 01/15/2025    ANIONGAP 12 01/15/2025    MG 1.93 05/03/2022    ALBUMIN 4.0 01/15/2025    GFRF 59 (A) 01/04/2023     Lab Results   Component Value Date    TRIG 92 01/15/2025    CHOL 158 01/15/2025    LDLCALC 73 01/15/2025    HDL 66.6 01/15/2025     No results found for: \"BMCBC\", \"CBCDIF\"     PHARMACEUTICAL ASSESSMENT:    MEDICATION RECONCILIATION    Was a medication reconciliation completed at this visit? No    Drug Interactions? No    Medication Documentation Review Audit       Reviewed by Cliff Sin, PharmD (Pharmacist) on 02/25/25 at 1026      Medication Order Taking? Sig Documenting Provider Last Dose Status   amLODIPine (Norvasc) 10 mg tablet [id number] Yes TAKE 1 TABLET BY MOUTH DAILY Xander Chen MD Taking Active   apixaban (Eliquis) 5 mg tablet [id number] Yes Take 1 tablet (5 mg) by mouth 2 times a day. Xander Chen MD  Active   Discontinued 02/25/25 1024   cholecalciferol (Vitamin D-3) 5,000 Units tablet [id number] Yes Take 1 tablet (5,000 Units) by mouth once daily. Historical Provider, MD Taking Active   levothyroxine (Synthroid, Levoxyl) 50 mcg tablet [id number] Yes TAKE 1 TABLET BY MOUTH ONCE  DAILY Sean Bunch MD  Active   Discontinued 02/25/25 1025   losartan (Cozaar) 100 mg tablet [id number] Yes Take 1 tablet (100 mg) by mouth once daily. Xander Chen MD Taking Active   metFORMIN (Glucophage) 500 mg tablet [id number] Yes TAKE 1 TABLET BY MOUTH TWICE  DAILY WITH MEALS Sean Bunch MD  Active   metoprolol succinate XL (Toprol-XL) 100 mg 24 hr tablet [id number] Yes TAKE 1 TABLET BY MOUTH DAILY Xander Chen MD  " Active   multivitamin-calcium carb (Flintstones Plus Calcium) tablet,chewable [id number] Yes Chew 1 tablet once daily. Historical Provider, MD Taking Active   rosuvastatin (Crestor) 20 mg tablet [id number] Yes TAKE 1 TABLET BY MOUTH DAILY Sean Bunch MD Taking Active                    DISEASE MANAGEMENT ASSESSMENT:     ANTICOAGULATION ASSESSMENT    The ASCVD Risk score (Akilah BROWN, et al., 2019) failed to calculate for the following reasons:    The 2019 ASCVD risk score is only valid for ages 40 to 79    DIAGNOSIS: prevention of nonvalvular atrial fibrilliation stroke and systemic embolism  - Patient is projected to be on anticoagulation indefinitely  - [id number] Score: [6] (only included if diagnosis is atrial fibrillation)   Age: [<65 (0)] [65-74 (+1)] [> 75 (+2)]: 2  Sex: [Male/Female (+1)]: 1  CHF history: [No/Yes(+1)]: 0  Hypertension history: [No/Yes(+1)]: 1  Stroke/TIA/thromboembolism history: [No/Yes(+2)]: 2  Vascular disease history (prior MI, peripheral artery disease, aortic plaque): [No/Yes(+1)]: 0  Diabetes history: [No/Yes(+1)]: 0     CURRENT PHARMACOTHERAPY:    Eliquis 5mg twice daily  Scr 1.16mg/dl  79 yoa  Weighs 72.6kg    Affordability/Accessibility: [facility]  Adherence/Organization: reports adherence  Adverse Reactions: none reported  Recent Hospitalizations: none reported  Recent Falls/Trauma: none reported  Changes in Tobacco or Alcohol Intake:   Tobacco: does not use  Alcohol: 1 glass of wine on occasion    EDUCATION/COUNSELING:   - Counseled patient on MOA, expectations, duration of therapy, contraindications, administration, and monitoring parameters  - Counseled patient of side effects that are indicative of bleeding such as dark tarry stool, unexplainable bruising, or vomiting up a coffee ground like substance    DISCUSSION/NOTES:   Reports adherence to Eliquis with no abnormal bruising or bleeding noted.  Patient understands that Eliquis refills will be through [facility] to help cover  the cost of the medication.    ASSESSMENT:    Assessment/Plan   Problem List Items Addressed This Visit       Venous thrombosis - Primary     No dose adjustments needed to Eliquis at today's visit based on their age, weight, and kidney function.           Other Visit Diagnoses       Unspecified atrial fibrillation (Multi)        Relevant Orders    Referral to Clinical Pharmacy              RECOMMENDATIONS/PLAN:    CONTINUE  Eliquis 5mg BID    Last Appnt with Referring Provider: 1/17/25  Next Appnt with Referring Provider: 7/18/25  Clinical Pharmacist follow up: 9/23/25  VAF/Application Expiration: Yes    Date: 3/3/26  Type of Encounter: David Sin PharmD    Verbal consent to manage patient's drug therapy was obtained from the patient . They were informed they may decline to participate or withdraw from participation in pharmacy services at any time.    Continue all meds under the continuation of care with the referring provider and clinical pharmacy team.

## 2025-03-25 NOTE — Clinical Note
Adherent to Eliquis with no bruising or bleeding noted. Pt understands future refills of Eliquis will now be through [facility].

## 2025-03-28 DIAGNOSIS — E03.8 OTHER SPECIFIED HYPOTHYROIDISM: ICD-10-CM

## 2025-03-29 RX ORDER — LEVOTHYROXINE SODIUM 50 UG/1
50 TABLET ORAL DAILY
Qty: 100 TABLET | Refills: 0 | Status: SHIPPED | OUTPATIENT
Start: 2025-03-29

## 2025-04-20 DIAGNOSIS — E13.9 OTHER SPECIFIED DIABETES MELLITUS WITHOUT COMPLICATION, WITHOUT LONG-TERM CURRENT USE OF INSULIN: ICD-10-CM

## 2025-04-21 RX ORDER — METFORMIN HYDROCHLORIDE 500 MG/1
500 TABLET ORAL
Qty: 200 TABLET | Refills: 0 | Status: SHIPPED | OUTPATIENT
Start: 2025-04-21

## 2025-04-22 DIAGNOSIS — I10 HYPERTENSION, UNSPECIFIED TYPE: ICD-10-CM

## 2025-04-23 LAB
ALBUMIN SERPL-MCNC: 4.2 G/DL (ref 3.6–5.1)
ALP SERPL-CCNC: 52 U/L (ref 37–153)
ALT SERPL-CCNC: 9 U/L (ref 6–29)
ANION GAP SERPL CALCULATED.4IONS-SCNC: 11 MMOL/L (CALC) (ref 7–17)
AST SERPL-CCNC: 16 U/L (ref 10–35)
BILIRUB SERPL-MCNC: 0.5 MG/DL (ref 0.2–1.2)
BUN SERPL-MCNC: 20 MG/DL (ref 7–25)
CALCIUM SERPL-MCNC: 9.3 MG/DL (ref 8.6–10.4)
CHLORIDE SERPL-SCNC: 103 MMOL/L (ref 98–110)
CHOLEST SERPL-MCNC: 220 MG/DL
CHOLEST/HDLC SERPL: 2.9 (CALC)
CO2 SERPL-SCNC: 25 MMOL/L (ref 20–32)
CREAT SERPL-MCNC: 1.07 MG/DL (ref 0.6–0.95)
EGFRCR SERPLBLD CKD-EPI 2021: 52 ML/MIN/1.73M2
ERYTHROCYTE [DISTWIDTH] IN BLOOD BY AUTOMATED COUNT: 12.3 % (ref 11–15)
EST. AVERAGE GLUCOSE BLD GHB EST-MCNC: 114 MG/DL
EST. AVERAGE GLUCOSE BLD GHB EST-SCNC: 6.3 MMOL/L
GLUCOSE SERPL-MCNC: 119 MG/DL (ref 65–99)
HBA1C MFR BLD: 5.6 %
HCT VFR BLD AUTO: 33.6 % (ref 35–45)
HDLC SERPL-MCNC: 77 MG/DL
HGB BLD-MCNC: 11 G/DL (ref 11.7–15.5)
LDLC SERPL CALC-MCNC: 126 MG/DL (CALC)
MCH RBC QN AUTO: 32.7 PG (ref 27–33)
MCHC RBC AUTO-ENTMCNC: 32.7 G/DL (ref 32–36)
MCV RBC AUTO: 100 FL (ref 80–100)
NONHDLC SERPL-MCNC: 143 MG/DL (CALC)
PLATELET # BLD AUTO: 261 THOUSAND/UL (ref 140–400)
PMV BLD REES-ECKER: 11.5 FL (ref 7.5–12.5)
POTASSIUM SERPL-SCNC: 4.1 MMOL/L (ref 3.5–5.3)
PROT SERPL-MCNC: 7.3 G/DL (ref 6.1–8.1)
RBC # BLD AUTO: 3.36 MILLION/UL (ref 3.8–5.1)
SODIUM SERPL-SCNC: 139 MMOL/L (ref 135–146)
TRIGL SERPL-MCNC: 76 MG/DL
TSH SERPL-ACNC: 3.46 MIU/L (ref 0.4–4.5)
WBC # BLD AUTO: 5.8 THOUSAND/UL (ref 3.8–10.8)

## 2025-04-23 RX ORDER — LOSARTAN POTASSIUM 100 MG/1
100 TABLET ORAL DAILY
Qty: 100 TABLET | Refills: 2 | Status: SHIPPED | OUTPATIENT
Start: 2025-04-23

## 2025-05-01 ENCOUNTER — TELEPHONE (OUTPATIENT)
Dept: PRIMARY CARE | Facility: CLINIC | Age: 80
End: 2025-05-01
Payer: MEDICARE

## 2025-05-01 NOTE — TELEPHONE ENCOUNTER
----- Message from Sean Bunch sent at 4/23/2025  3:46 PM EDT -----  Patient needs to make a follow up appt.  ----- Message -----  From: Darilng Payton Results In  Sent: 4/22/2025   9:15 PM EDT  To: Sean Bunch MD

## 2025-05-09 ENCOUNTER — APPOINTMENT (OUTPATIENT)
Dept: PRIMARY CARE | Facility: CLINIC | Age: 80
End: 2025-05-09
Payer: MEDICARE

## 2025-05-09 VITALS
SYSTOLIC BLOOD PRESSURE: 124 MMHG | HEIGHT: 59 IN | WEIGHT: 158.4 LBS | DIASTOLIC BLOOD PRESSURE: 66 MMHG | BODY MASS INDEX: 31.93 KG/M2

## 2025-05-09 DIAGNOSIS — N18.31 CHRONIC KIDNEY DISEASE, STAGE 3A (MULTI): ICD-10-CM

## 2025-05-09 DIAGNOSIS — E78.00 HYPERCHOLESTEROLEMIA: ICD-10-CM

## 2025-05-09 DIAGNOSIS — D64.9 ANEMIA, UNSPECIFIED TYPE: ICD-10-CM

## 2025-05-09 DIAGNOSIS — Z79.01 CURRENT USE OF LONG TERM ANTICOAGULATION: ICD-10-CM

## 2025-05-09 DIAGNOSIS — E03.9 HYPOTHYROIDISM, UNSPECIFIED TYPE: ICD-10-CM

## 2025-05-09 DIAGNOSIS — I10 HYPERTENSION, UNSPECIFIED TYPE: ICD-10-CM

## 2025-05-09 DIAGNOSIS — E13.9 OTHER SPECIFIED DIABETES MELLITUS WITHOUT COMPLICATION, WITHOUT LONG-TERM CURRENT USE OF INSULIN: Primary | ICD-10-CM

## 2025-05-09 PROCEDURE — 1159F MED LIST DOCD IN RCRD: CPT | Performed by: INTERNAL MEDICINE

## 2025-05-09 PROCEDURE — 3074F SYST BP LT 130 MM HG: CPT | Performed by: INTERNAL MEDICINE

## 2025-05-09 PROCEDURE — 99213 OFFICE O/P EST LOW 20 MIN: CPT | Performed by: INTERNAL MEDICINE

## 2025-05-09 PROCEDURE — 3078F DIAST BP <80 MM HG: CPT | Performed by: INTERNAL MEDICINE

## 2025-05-09 NOTE — PROGRESS NOTES
"Subjective   Patient ID: Viji Caballero is a 79 y.o. female who presents for Follow-up (on various conditions).    Viji today came here for follow-up on various conditions.  Overall she is okay.  She lost weight on purpose.  She is feeling better.  Blood sugar okay.  No problem. She came for follow up on various conditions.    I have personally reviewed the patient's Past Medical History, Medications, Allergies, Social History, and Family History in the EMR.    Review of Systems   All other systems reviewed and are negative.    Objective   /66   Ht 1.499 m (4' 11\")   Wt 71.8 kg (158 lb 6.4 oz)   BMI 31.99 kg/m²     Physical Exam  Vitals reviewed.   Cardiovascular:      Heart sounds: Normal heart sounds, S1 normal and S2 normal. No murmur heard.     No friction rub.   Pulmonary:      Effort: Pulmonary effort is normal.      Breath sounds: Normal breath sounds and air entry.   Abdominal:      Palpations: There is no hepatomegaly, splenomegaly or mass.   Musculoskeletal:      Right lower leg: No edema.      Left lower leg: No edema.   Lymphadenopathy:      Lower Body: No right inguinal adenopathy. No left inguinal adenopathy.   Neurological:      Cranial Nerves: Cranial nerves 2-12 are intact.      Sensory: No sensory deficit.      Motor: Motor function is intact.      Deep Tendon Reflexes: Reflexes are normal and symmetric.     LAB WORK: Laboratory testing discussed.    Assessment/Plan   Problem List Items Addressed This Visit           ICD-10-CM    Anemia D64.9    High blood pressure I10    Hypercholesterolemia E78.00    Relevant Orders    Lipid Panel    CBC and Auto Differential    Hypothyroidism E03.9    Relevant Orders    Lipid Panel    CBC and Auto Differential    Thyroid Stimulating Hormone     Other Visit Diagnoses         Codes      Other specified diabetes mellitus without complication, without long-term current use of insulin    -  Primary E13.9    Relevant Orders    Hemoglobin A1C      " Current use of long term anticoagulation     Z79.01        1. Type 2 diabetes.  Hemoglobin A1c okay.  2. Hypertension, okay.  3. High cholesterol, stable.  4. Thyroid, monitor.  5. Anemia.  It is a longstanding anemia, stable.  No sign of GI bleed.  6. Anticoagulation.  She is on Eliquis and she is doing good.  7. Follow-up appointment with me in three months, but always happy to serve her anytime if necessary.    Chester Attestation  By signing my name below, IVonda Scribe attest that this documentation has been prepared under the direction and in the presence of Sean Bunch MD.     All medical record entries made by the chester were personally dictated by me I have reviewed the chart and agree the record accurately reflects my personal performance of his history physical examination and management

## 2025-05-10 DIAGNOSIS — I10 HYPERTENSION, UNSPECIFIED TYPE: ICD-10-CM

## 2025-05-10 PROBLEM — N18.31 CHRONIC KIDNEY DISEASE, STAGE 3A (MULTI): Status: ACTIVE | Noted: 2025-05-10

## 2025-05-12 RX ORDER — AMLODIPINE BESYLATE 10 MG/1
10 TABLET ORAL DAILY
Qty: 100 TABLET | Refills: 2 | Status: SHIPPED | OUTPATIENT
Start: 2025-05-12

## 2025-05-27 PROCEDURE — RXMED WILLOW AMBULATORY MEDICATION CHARGE

## 2025-05-29 ENCOUNTER — PHARMACY VISIT (OUTPATIENT)
Dept: PHARMACY | Facility: CLINIC | Age: 80
End: 2025-05-29
Payer: COMMERCIAL

## 2025-06-06 DIAGNOSIS — E03.8 OTHER SPECIFIED HYPOTHYROIDISM: ICD-10-CM

## 2025-06-07 RX ORDER — LEVOTHYROXINE SODIUM 50 UG/1
50 TABLET ORAL DAILY
Qty: 100 TABLET | Refills: 0 | Status: SHIPPED | OUTPATIENT
Start: 2025-06-07

## 2025-07-08 LAB
BASOPHILS # BLD AUTO: 31 CELLS/UL (ref 0–200)
BASOPHILS NFR BLD AUTO: 0.6 %
CHOLEST SERPL-MCNC: 217 MG/DL
CHOLEST/HDLC SERPL: 3.1 (CALC)
EOSINOPHIL # BLD AUTO: 138 CELLS/UL (ref 15–500)
EOSINOPHIL NFR BLD AUTO: 2.7 %
ERYTHROCYTE [DISTWIDTH] IN BLOOD BY AUTOMATED COUNT: 12.8 % (ref 11–15)
EST. AVERAGE GLUCOSE BLD GHB EST-MCNC: 114 MG/DL
EST. AVERAGE GLUCOSE BLD GHB EST-SCNC: 6.3 MMOL/L
HBA1C MFR BLD: 5.6 %
HCT VFR BLD AUTO: 34.7 % (ref 35–45)
HDLC SERPL-MCNC: 71 MG/DL
HGB BLD-MCNC: 11.3 G/DL (ref 11.7–15.5)
LDLC SERPL CALC-MCNC: 124 MG/DL (CALC)
LYMPHOCYTES # BLD AUTO: 1851 CELLS/UL (ref 850–3900)
LYMPHOCYTES NFR BLD AUTO: 36.3 %
MCH RBC QN AUTO: 33.4 PG (ref 27–33)
MCHC RBC AUTO-ENTMCNC: 32.6 G/DL (ref 32–36)
MCV RBC AUTO: 102.7 FL (ref 80–100)
MONOCYTES # BLD AUTO: 479 CELLS/UL (ref 200–950)
MONOCYTES NFR BLD AUTO: 9.4 %
NEUTROPHILS # BLD AUTO: 2601 CELLS/UL (ref 1500–7800)
NEUTROPHILS NFR BLD AUTO: 51 %
NONHDLC SERPL-MCNC: 146 MG/DL (CALC)
PLATELET # BLD AUTO: 261 THOUSAND/UL (ref 140–400)
PMV BLD REES-ECKER: 11.7 FL (ref 7.5–12.5)
RBC # BLD AUTO: 3.38 MILLION/UL (ref 3.8–5.1)
TRIGL SERPL-MCNC: 114 MG/DL
TSH SERPL-ACNC: 3.4 MIU/L (ref 0.4–4.5)
WBC # BLD AUTO: 5.1 THOUSAND/UL (ref 3.8–10.8)

## 2025-07-12 ENCOUNTER — TELEPHONE (OUTPATIENT)
Dept: PRIMARY CARE | Facility: CLINIC | Age: 80
End: 2025-07-12
Payer: MEDICARE

## 2025-07-12 NOTE — TELEPHONE ENCOUNTER
----- Message from Sean Bunch sent at 7/8/2025  5:47 PM EDT -----  Patient needs to make a follow up appt.  ----- Message -----  From: Darling Payton Results In  Sent: 7/8/2025  12:09 AM EDT  To: Sean Bunch MD

## 2025-07-13 NOTE — PROGRESS NOTES
Primary Care Physician: Sean Bunch MD  Date of Visit: 07/18/2025 10:45 AM EDT  Location of visit: [facility] [place] [address]     Chief Complaint:   No chief complaint on file.    HPI / Summary:   Viji Caballero is a 79 y.o. female presents for follow up     ROS    Medical History:   She has a past medical history of Arthritis, Diabetes mellitus (Multi), GERD (gastroesophageal reflux disease), High cholesterol, Hypertension, Personal history of other specified conditions (12/05/2018), and Postmenopausal.  Surgical Hx:   She has a past surgical history that includes Appendectomy; Paraesophageal hernia repair (07/29/2019); and Ovarian cyst removal.   Social Hx:   She reports that she has never smoked. She has never used smokeless tobacco. She reports that she does not drink alcohol and does not use drugs.  Family Hx:   Her family history includes Diabetes in her mother and another family member; Hypertension in an other family member; Lung cancer in her father; Stroke in her mother.   Allergies:  No Known Allergies  Outpatient Medications:  Current Outpatient Medications   Medication Instructions    amLODIPine (NORVASC) 10 mg, oral, Daily    cholecalciferol (Vitamin D-3) 5,000 Units tablet 1 tablet, Daily    Eliquis 5 mg, oral, 2 times daily    levothyroxine (SYNTHROID, LEVOXYL) 50 mcg, oral, Daily    losartan (COZAAR) 100 mg, oral, Daily    metFORMIN (GLUCOPHAGE) 500 mg, oral, 2 times daily (morning and late afternoon)    metoprolol succinate XL (TOPROL-XL) 100 mg, oral, Daily    multivitamin-calcium carb (Flintstones Plus Calcium) tablet,chewable 1 tablet, Daily    rosuvastatin (CRESTOR) 20 mg, oral, Daily     Physical Exam:  There were no vitals filed for this visit.    Wt Readings from Last 5 Encounters:   05/09/25 71.8 kg (158 lb 6.4 oz)   01/20/25 72.6 kg (160 lb)   01/17/25 72.3 kg (159 lb 8 oz)   10/16/24 72.1 kg (159 lb)   10/10/24 73 kg (161 lb)     Physical Exam  JVP not elevated. Carotid impulses are 2+  without overlying bruit.   Chest exhibits fair to good air movement with completely clear breath sounds.   The cardiac rhythm is regular with no premature beats.   Normal S1 and S2. No gallop, murmur or rub, or click.   Abdomen is soft and benign without focal tenderness.   With no lower leg edema. The pedal pulses are intact.     Last Labs:  Office Visit on 05/09/2025   Component Date Value    CHOLESTEROL, TOTAL 07/07/2025 217 (H)     HDL CHOLESTEROL 07/07/2025 71     TRIGLYCERIDES 07/07/2025 114     LDL-CHOLESTEROL 07/07/2025 124 (H)     CHOL/HDLC RATIO 07/07/2025 3.1     NON HDL CHOLESTEROL 07/07/2025 146 (H)     WHITE BLOOD CELL COUNT 07/07/2025 5.1     RED BLOOD CELL COUNT 07/07/2025 3.38 (L)     HEMOGLOBIN 07/07/2025 11.3 (L)     HEMATOCRIT 07/07/2025 34.7 (L)     MCV 07/07/2025 102.7 (H)     MCH 07/07/2025 33.4 (H)     MCHC 07/07/2025 32.6     RDW 07/07/2025 12.8     PLATELET COUNT 07/07/2025 261     MPV 07/07/2025 11.7     ABSOLUTE NEUTROPHILS 07/07/2025 2,601     ABSOLUTE LYMPHOCYTES 07/07/2025 1,851     ABSOLUTE MONOCYTES 07/07/2025 479     ABSOLUTE EOSINOPHILS 07/07/2025 138     ABSOLUTE BASOPHILS 07/07/2025 31     NEUTROPHILS 07/07/2025 51     LYMPHOCYTES 07/07/2025 36.3     MONOCYTES 07/07/2025 9.4     EOSINOPHILS 07/07/2025 2.7     BASOPHILS 07/07/2025 0.6     TSH 07/07/2025 3.40     HEMOGLOBIN A1c 07/07/2025 5.6     eAG (mg/dL) 07/07/2025 114     eAG (mmol/L) 07/07/2025 6.3    Orders Only on 04/22/2025   Component Date Value    CHOLESTEROL, TOTAL 04/22/2025 220 (H)     HDL CHOLESTEROL 04/22/2025 77     TRIGLYCERIDES 04/22/2025 76     LDL-CHOLESTEROL 04/22/2025 126 (H)     CHOL/HDLC RATIO 04/22/2025 2.9     NON HDL CHOLESTEROL 04/22/2025 143 (H)     GLUCOSE 04/22/2025 119 (H)     UREA NITROGEN (BUN) 04/22/2025 20     CREATININE 04/22/2025 1.07 (H)     EGFR 04/22/2025 52 (L)     SODIUM 04/22/2025 139     POTASSIUM 04/22/2025 4.1     CHLORIDE 04/22/2025 103     CARBON DIOXIDE 04/22/2025 25      ELECTROLYTE BALANCE 04/22/2025 11     CALCIUM 04/22/2025 9.3     PROTEIN, TOTAL 04/22/2025 7.3     ALBUMIN 04/22/2025 4.2     BILIRUBIN, TOTAL 04/22/2025 0.5     ALKALINE PHOSPHATASE 04/22/2025 52     AST 04/22/2025 16     ALT 04/22/2025 9     WHITE BLOOD CELL COUNT 04/22/2025 5.8     RED BLOOD CELL COUNT 04/22/2025 3.36 (L)     HEMOGLOBIN 04/22/2025 11.0 (L)     HEMATOCRIT 04/22/2025 33.6 (L)     MCV 04/22/2025 100.0     MCH 04/22/2025 32.7     MCHC 04/22/2025 32.7     RDW 04/22/2025 12.3     PLATELET COUNT 04/22/2025 261     MPV 04/22/2025 11.5     TSH 04/22/2025 3.46     HEMOGLOBIN A1c 04/22/2025 5.6     eAG (mg/dL) 04/22/2025 114     eAG (mmol/L) 04/22/2025 6.3         Assessment/Plan   1.  Hypertension.  Patient is actually feeling relatively well no lightheadedness or dizziness.  Lab work from 6/10/2024 includes a normal SMA panel other than creatinine 1.45.  Will reduce her antihypertensive therapy slightly by changing from olmesartan HCT 40-25 mg daily to straight losartan 100 mg daily.  Patient will remain on amlodipine 10 mg daily and metoprolol unchanged.  2.?  Coronary artery disease.  Diagnosis not confirmed by CT coronary calcium score of 0 when performed on 12/28/2018.  The patient did have an echocardiogram on 12/22/2017 demonstrating an LV ejection fraction of 60-65% mild aortic valve insufficiency moderate left atrial enlargement normal estimated PA systolic pressure.  Patient return in 6 months for follow-up and we will check an echocardiogram after that visit.  Routine lab work from 6/10/2024 included a CBC with hematocrit 32.4.  Glycohemoglobin 5.5%.  SMA panel normal except creatinine 1.45.  3.  Hyperlipidemia.  Very good response to rosuvastatin 20 mg daily.  Lab work 6/10/2024 includes cholesterol 139 LDL 61 HDL 55 triglyceride 111.  Lab work from 1/15/2025 includes cholesterol 158 LDL 73 HDL 66 triglyceride 92.  4.  Type 2 diabetes.  Glycohemoglobin 5.5% on 6/10/2024.  Lab work 1/15/2025  includes a glycohemoglobin of 5.4%.  Patient is currently on metformin 500 mg twice daily which will be kept unchanged.  Additional lab work from 1/15/2025 included CBC with hematocrit 33.8, normal SMA panel with creatinine 1.16, TSH 3.03 vitamin D level 61.  Urine C&S is positive gram-negative rods she will follow-up with internal medicine.  5.  Hypothyroidism.  6.  Atrial fibrillation.  Patient detected to have irregular heart cardiac rhythm on exam office visit 1/17/2025.  EKG confirmed therapy presence of new onset atrial fibrillation with occasional PVCs.  The ventricular rate is already controlled with the Toprol- mg daily.  She will be started on Eliquis 5 mg twice daily.  No plans at this point to pursue electrical cardioversion.        Orders:  No orders of the defined types were placed in this encounter.     Followup Appts:  Future Appointments   Date Time Provider Department Center   7/18/2025 10:45 AM Xander Chen MD [id number] [facility]   9/23/2025 10:40 AM Cliff Sin, PharmD [id number] [facility]   11/5/2025 11:30 AM Mayra Bach, APRN-CNP [id number] [place]           ____________________________________________________________  Xander Chen MD  [facility]  Assistant Clinical Professor, [employer]  [facility]

## 2025-07-16 ENCOUNTER — OFFICE VISIT (OUTPATIENT)
Dept: PRIMARY CARE | Facility: CLINIC | Age: 80
End: 2025-07-16
Payer: MEDICARE

## 2025-07-16 VITALS
HEIGHT: 59 IN | SYSTOLIC BLOOD PRESSURE: 128 MMHG | DIASTOLIC BLOOD PRESSURE: 72 MMHG | BODY MASS INDEX: 31.65 KG/M2 | WEIGHT: 157 LBS

## 2025-07-16 DIAGNOSIS — E03.9 HYPOTHYROIDISM, UNSPECIFIED TYPE: ICD-10-CM

## 2025-07-16 DIAGNOSIS — E13.9 OTHER SPECIFIED DIABETES MELLITUS WITHOUT COMPLICATION, WITHOUT LONG-TERM CURRENT USE OF INSULIN: ICD-10-CM

## 2025-07-16 DIAGNOSIS — R82.71 BACTERIURIA: ICD-10-CM

## 2025-07-16 DIAGNOSIS — E78.00 HYPERCHOLESTEROLEMIA: ICD-10-CM

## 2025-07-16 DIAGNOSIS — I10 HYPERTENSION, UNSPECIFIED TYPE: ICD-10-CM

## 2025-07-16 PROCEDURE — 99213 OFFICE O/P EST LOW 20 MIN: CPT | Performed by: INTERNAL MEDICINE

## 2025-07-16 PROCEDURE — G2211 COMPLEX E/M VISIT ADD ON: HCPCS | Performed by: INTERNAL MEDICINE

## 2025-07-16 PROCEDURE — 3078F DIAST BP <80 MM HG: CPT | Performed by: INTERNAL MEDICINE

## 2025-07-16 PROCEDURE — 3074F SYST BP LT 130 MM HG: CPT | Performed by: INTERNAL MEDICINE

## 2025-07-16 PROCEDURE — 1159F MED LIST DOCD IN RCRD: CPT | Performed by: INTERNAL MEDICINE

## 2025-07-16 NOTE — PROGRESS NOTES
"Subjective   Patient ID: Viji Caballero is a 79 y.o. female who presents for Follow-up.    Ms. Hallman today came here for multiple medical issues.  Overall, she is a happy person.  Appetite and weight are okay.  No chest pain or shortness of breath.  Taking medications regularly with no side effects.    I have personally reviewed the patient's Past Medical History, Medications, Allergies, Social History, and Family History in the EMR.    Review of Systems   All other systems reviewed and are negative.    Objective   /72   Ht (!) 1.499 m (4' 11\")   Wt 71.2 kg (157 lb)   BMI 31.71 kg/m²     Physical Exam  Vitals reviewed.     Cardiovascular:      Heart sounds: Normal heart sounds, S1 normal and S2 normal. No murmur heard.     No friction rub.   Pulmonary:      Effort: Pulmonary effort is normal.      Breath sounds: Normal breath sounds and air entry.   Abdominal:      Palpations: There is no hepatomegaly, splenomegaly or mass.     Musculoskeletal:      Right lower leg: No edema.      Left lower leg: No edema.   Lymphadenopathy:      Lower Body: No right inguinal adenopathy. No left inguinal adenopathy.     Neurological:      Cranial Nerves: Cranial nerves 2-12 are intact.      Sensory: No sensory deficit.      Motor: Motor function is intact.      Deep Tendon Reflexes: Reflexes are normal and symmetric.     LAB WORK:  Laboratory testing discussed.    Assessment/Plan   Problem List Items Addressed This Visit           ICD-10-CM    High blood pressure I10    Relevant Orders    CBC    Urinalysis with Reflex Culture and Microscopic    Hypercholesterolemia E78.00    Relevant Orders    Comprehensive Metabolic Panel    Lipid Panel    Hypothyroidism E03.9    Relevant Orders    Thyroid Stimulating Hormone    Urinalysis with Reflex Culture and Microscopic     Other Visit Diagnoses         Codes      Other specified diabetes mellitus without complication, without long-term current use of insulin     E13.9    " Relevant Orders    Hemoglobin A1C      Bacteriuria     R82.71    Relevant Orders    Urinalysis with Reflex Culture and Microscopic        1. Type 2 diabetes.  Hemoglobin A1c okay.  2. Hypertension, okay.  3. High cholesterol, okay.  4. Thyroid.  Monitor.  5. Preventive care.  She does not want Pap test, mammogram and colonoscopy.  6. Repeat blood work in three months.  I urged her at the same time she will do Medicare Wellness.  I encouraged her.  7. Skin.  She is regular with the skin care.    Scribe Attestation  By signing my name below, IVonda Scribe attest that this documentation has been prepared under the direction and in the presence of Sean Bunch MD.     All medical record entries made by the chester were personally dictated by me I have reviewed the chart and agree the record accurately reflects my personal performance of his history physical examination and management

## 2025-07-18 ENCOUNTER — OFFICE VISIT (OUTPATIENT)
Dept: CARDIOLOGY | Facility: CLINIC | Age: 80
End: 2025-07-18
Payer: MEDICARE

## 2025-07-18 VITALS
OXYGEN SATURATION: 97 % | HEIGHT: 59 IN | WEIGHT: 158 LBS | HEART RATE: 92 BPM | SYSTOLIC BLOOD PRESSURE: 110 MMHG | BODY MASS INDEX: 31.85 KG/M2 | DIASTOLIC BLOOD PRESSURE: 76 MMHG

## 2025-07-18 DIAGNOSIS — E11.9 TYPE 2 DIABETES MELLITUS WITHOUT COMPLICATION, WITH LONG-TERM CURRENT USE OF INSULIN: Primary | ICD-10-CM

## 2025-07-18 DIAGNOSIS — E78.2 MIXED HYPERLIPIDEMIA: ICD-10-CM

## 2025-07-18 DIAGNOSIS — Z79.4 TYPE 2 DIABETES MELLITUS WITHOUT COMPLICATION, WITH LONG-TERM CURRENT USE OF INSULIN: Primary | ICD-10-CM

## 2025-07-18 PROCEDURE — 1159F MED LIST DOCD IN RCRD: CPT | Performed by: INTERNAL MEDICINE

## 2025-07-18 PROCEDURE — 1160F RVW MEDS BY RX/DR IN RCRD: CPT | Performed by: INTERNAL MEDICINE

## 2025-07-18 PROCEDURE — 99212 OFFICE O/P EST SF 10 MIN: CPT

## 2025-07-18 PROCEDURE — G2211 COMPLEX E/M VISIT ADD ON: HCPCS | Performed by: INTERNAL MEDICINE

## 2025-07-18 PROCEDURE — 3078F DIAST BP <80 MM HG: CPT | Performed by: INTERNAL MEDICINE

## 2025-07-18 PROCEDURE — 1126F AMNT PAIN NOTED NONE PRSNT: CPT | Performed by: INTERNAL MEDICINE

## 2025-07-18 PROCEDURE — 3074F SYST BP LT 130 MM HG: CPT | Performed by: INTERNAL MEDICINE

## 2025-07-18 PROCEDURE — 99214 OFFICE O/P EST MOD 30 MIN: CPT | Performed by: INTERNAL MEDICINE

## 2025-07-18 RX ORDER — ROSUVASTATIN CALCIUM 20 MG/1
20 TABLET, COATED ORAL DAILY
Qty: 90 TABLET | Refills: 3 | Status: SHIPPED | OUTPATIENT
Start: 2025-07-18 | End: 2026-07-18

## 2025-07-18 RX ORDER — METFORMIN HYDROCHLORIDE 500 MG/1
500 TABLET ORAL DAILY
Qty: 90 TABLET | Refills: 3 | Status: SHIPPED | OUTPATIENT
Start: 2025-07-18 | End: 2026-07-18

## 2025-07-18 ASSESSMENT — LIFESTYLE VARIABLES
HOW OFTEN DO YOU HAVE A DRINK CONTAINING ALCOHOL: NEVER
SKIP TO QUESTIONS 9-10: 1
AUDIT-C TOTAL SCORE: 0
HOW OFTEN DO YOU HAVE SIX OR MORE DRINKS ON ONE OCCASION: NEVER
HOW MANY STANDARD DRINKS CONTAINING ALCOHOL DO YOU HAVE ON A TYPICAL DAY: PATIENT DOES NOT DRINK

## 2025-07-18 ASSESSMENT — COLUMBIA-SUICIDE SEVERITY RATING SCALE - C-SSRS
1. IN THE PAST MONTH, HAVE YOU WISHED YOU WERE DEAD OR WISHED YOU COULD GO TO SLEEP AND NOT WAKE UP?: NO
2. HAVE YOU ACTUALLY HAD ANY THOUGHTS OF KILLING YOURSELF?: NO
6. HAVE YOU EVER DONE ANYTHING, STARTED TO DO ANYTHING, OR PREPARED TO DO ANYTHING TO END YOUR LIFE?: NO

## 2025-07-18 ASSESSMENT — PAIN SCALES - GENERAL: PAINLEVEL_OUTOF10: 0-NO PAIN

## 2025-07-18 ASSESSMENT — ENCOUNTER SYMPTOMS
OCCASIONAL FEELINGS OF UNSTEADINESS: 0
DEPRESSION: 0
LOSS OF SENSATION IN FEET: 0

## 2025-08-24 PROCEDURE — RXMED WILLOW AMBULATORY MEDICATION CHARGE

## 2025-08-27 ENCOUNTER — PHARMACY VISIT (OUTPATIENT)
Dept: PHARMACY | Facility: CLINIC | Age: 80
End: 2025-08-27
Payer: COMMERCIAL

## 2025-09-23 ENCOUNTER — APPOINTMENT (OUTPATIENT)
Dept: PHARMACY | Facility: HOSPITAL | Age: 80
End: 2025-09-23
Payer: MEDICARE

## 2025-10-08 ENCOUNTER — APPOINTMENT (OUTPATIENT)
Dept: SURGERY | Facility: CLINIC | Age: 80
End: 2025-10-08
Payer: COMMERCIAL

## 2025-10-17 ENCOUNTER — APPOINTMENT (OUTPATIENT)
Dept: PRIMARY CARE | Facility: CLINIC | Age: 80
End: 2025-10-17
Payer: MEDICARE

## 2025-11-05 ENCOUNTER — APPOINTMENT (OUTPATIENT)
Dept: SURGERY | Facility: CLINIC | Age: 80
End: 2025-11-05
Payer: MEDICARE